# Patient Record
Sex: MALE | Race: WHITE | Employment: OTHER | URBAN - NONMETROPOLITAN AREA
[De-identification: names, ages, dates, MRNs, and addresses within clinical notes are randomized per-mention and may not be internally consistent; named-entity substitution may affect disease eponyms.]

---

## 2023-02-06 ENCOUNTER — APPOINTMENT (OUTPATIENT)
Dept: GENERAL RADIOLOGY | Age: 48
End: 2023-02-06
Payer: COMMERCIAL

## 2023-02-06 ENCOUNTER — HOSPITAL ENCOUNTER (EMERGENCY)
Age: 48
Discharge: HOME | End: 2023-02-06
Attending: EMERGENCY MEDICINE
Payer: COMMERCIAL

## 2023-02-06 VITALS
WEIGHT: 215 LBS | SYSTOLIC BLOOD PRESSURE: 138 MMHG | RESPIRATION RATE: 18 BRPM | HEIGHT: 71 IN | DIASTOLIC BLOOD PRESSURE: 107 MMHG | OXYGEN SATURATION: 95 % | TEMPERATURE: 97.2 F | HEART RATE: 93 BPM | BODY MASS INDEX: 30.1 KG/M2

## 2023-02-06 DIAGNOSIS — R07.89 ATYPICAL CHEST PAIN: Primary | ICD-10-CM

## 2023-02-06 LAB
ALBUMIN SERPL BCP-MCNC: 4.1 GM/DL (ref 3.4–5)
ALP SERPL-CCNC: 62 U/L (ref 46–116)
ALT SERPL W P-5'-P-CCNC: 62 U/L (ref 14–63)
ANION GAP SERPL CALC-SCNC: 6 MEQ/L (ref 8–16)
AST SERPL W P-5'-P-CCNC: 20 U/L (ref 15–37)
BASOPHILS # BLD: 0.5 % (ref 0–3)
BASOPHILS ABSOLUTE: 0 THOU/MM3 (ref 0–0.1)
BILIRUB SERPL-MCNC: 0.8 MG/DL (ref 0.2–1)
BUN SERPL-MCNC: 13 MG/DL (ref 7–18)
CALCIUM SERPL-MCNC: 8.9 MG/DL (ref 8.5–10.1)
CHLORIDE SERPL-SCNC: 101 MEQ/L (ref 98–107)
CO2 SERPL-SCNC: 30 MEQ/L (ref 21–32)
CREAT SERPL-MCNC: 0.9 MG/DL (ref 0.6–1.3)
EOSINOPHILS ABSOLUTE: 0.2 THOU/MM3 (ref 0–0.5)
EOSINOPHILS RELATIVE PERCENT: 3.2 % (ref 0–4)
GFR SERPL CREATININE-BSD FRML MDRD: > 60 ML/MIN/1.73M2
GLUCOSE SERPL-MCNC: 103 MG/DL (ref 74–106)
HCT VFR BLD CALC: 42.6 % (ref 42–52)
HEMOGLOBIN: 14.7 GM/DL (ref 14–18)
IMMATURE GRANS (ABS): 0 THOU/MM3 (ref 0–0.07)
IMMATURE GRANULOCYTES: 0 %
LYMPHOCYTES # BLD AUTO: 31 % (ref 15–47)
LYMPHOCYTES ABSOLUTE: 1.9 THOU/MM3 (ref 1–4.8)
MAGNESIUM SERPL-MCNC: 2.2 MG/DL (ref 1.8–2.4)
MCH RBC QN AUTO: 29.9 PG (ref 26–32)
MCHC RBC AUTO-ENTMCNC: 34.5 GM/DL (ref 31–35)
MCV RBC AUTO: 86.8 FL (ref 80–94)
MONOCYTES: 0.3 THOU/MM3 (ref 0.3–1.3)
MONOCYTES: 5.4 % (ref 0–12)
PDW BLD-RTO: 12.1 % (ref 11.5–14.9)
PLATELET # BLD AUTO: 294 THOU/MM3 (ref 130–400)
PMV BLD AUTO: 8.9 FL (ref 9.4–12.4)
POTASSIUM SERPL-SCNC: 4.2 MEQ/L (ref 3.5–5.1)
PROT SERPL-MCNC: 7.7 GM/DL (ref 6.4–8.2)
RBC # BLD: 4.91 MILL/MM3 (ref 4.5–6.1)
SEG NEUTROPHILS: 59.9 % (ref 43–75)
SEGMENTED NEUTROPHILS ABSOLUTE COUNT: 3.8 THOU/MM3 (ref 1.8–7.7)
SODIUM SERPL-SCNC: 137 MEQ/L (ref 136–145)
TROPONIN, HIGH SENSITIVITY: < 4 PG/ML (ref 0–76.1)
WBC # BLD: 6.3 THOU/MM3 (ref 4.8–10.8)

## 2023-02-06 PROCEDURE — 84484 ASSAY OF TROPONIN QUANT: CPT

## 2023-02-06 PROCEDURE — 93005 ELECTROCARDIOGRAM TRACING: CPT | Performed by: EMERGENCY MEDICINE

## 2023-02-06 PROCEDURE — 80053 COMPREHEN METABOLIC PANEL: CPT

## 2023-02-06 PROCEDURE — 85025 COMPLETE CBC W/AUTO DIFF WBC: CPT

## 2023-02-06 PROCEDURE — 71046 X-RAY EXAM CHEST 2 VIEWS: CPT

## 2023-02-06 PROCEDURE — 99285 EMERGENCY DEPT VISIT HI MDM: CPT

## 2023-02-06 PROCEDURE — 83735 ASSAY OF MAGNESIUM: CPT

## 2023-02-06 PROCEDURE — 6370000000 HC RX 637 (ALT 250 FOR IP): Performed by: EMERGENCY MEDICINE

## 2023-02-06 RX ORDER — LOSARTAN POTASSIUM 50 MG/1
50 TABLET ORAL DAILY
COMMUNITY
End: 2023-02-07 | Stop reason: SDUPTHER

## 2023-02-06 RX ORDER — ASPIRIN 81 MG/1
324 TABLET, CHEWABLE ORAL ONCE
Status: COMPLETED | OUTPATIENT
Start: 2023-02-06 | End: 2023-02-06

## 2023-02-06 RX ADMIN — ASPIRIN 324 MG: 81 TABLET, CHEWABLE ORAL at 08:25

## 2023-02-06 ASSESSMENT — HEART SCORE: ECG: 0

## 2023-02-06 ASSESSMENT — PAIN - FUNCTIONAL ASSESSMENT
PAIN_FUNCTIONAL_ASSESSMENT: NONE - DENIES PAIN
PAIN_FUNCTIONAL_ASSESSMENT: NONE - DENIES PAIN

## 2023-02-06 ASSESSMENT — ENCOUNTER SYMPTOMS
NAUSEA: 0
ABDOMINAL PAIN: 0
SHORTNESS OF BREATH: 0
BLURRED VISION: 0
COUGH: 0
WHEEZING: 0

## 2023-02-06 NOTE — ED PROVIDER NOTES
[facility]  eMERGENCY dEPARTMENT eNCOUnter             [address]    CHIEF COMPLAINT    My blood pressure has been high    Nurses Notes reviewed and I agree except as noted in the HPI. HPI    Laurence Woodruff is a 50 y.o. male who presents stating that over the weekend he was feeling some pressure in his head and behind his eyes with occasional pressure in his chest and his left shoulder. He started checking his blood pressure and it was elevated. At 4:00 this morning taking his losartan 50 mg early because he had a pressure sensation in his head and he could not sleep. He has been on medication for blood pressure for a while, and it has been doing well up until recently. He denies any history of heart disease. He denies any current chest pain. REVIEW OF SYSTEMS      Review of Systems   Constitutional:  Positive for malaise/fatigue. HENT:  Negative for congestion. Eyes:  Negative for blurred vision. Respiratory:  Negative for cough, shortness of breath and wheezing. Cardiovascular:  Negative for palpitations and leg swelling. Gastrointestinal:  Negative for abdominal pain and nausea. Musculoskeletal: Negative. Neurological:  Negative for dizziness and focal weakness. \"Pressure \"in head   All other systems reviewed and are negative. PAST MEDICAL HISTORY     has a past medical history of Hypertension. SURGICAL HISTORY     has a past surgical history that includes cyst removal (2004). CURRENT MEDICATIONS    Previous Medications    LOSARTAN (COZAAR) 50 MG TABLET    Take 50 mg by mouth daily       ALLERGIES    has No Known Allergies. FAMILY HISTORY    He indicated that the status of his father is unknown. He indicated that the status of his brother is unknown.  He indicated that the status of his paternal uncle is unknown.   family history includes Heart Disease in his paternal uncle; High Blood Pressure in his brother and father. SOCIAL HISTORY     reports that he has never smoked. He has never used smokeless tobacco. He reports that he does not drink alcohol and does not use drugs. PHYSICAL EXAM       INITIAL VITALS: BP (!) 138/107   Pulse 93   Temp 97.2 °F (36.2 °C) (Temporal)   Resp 18   Ht 5' 11\" (1.803 m)   Wt 215 lb (97.5 kg)   SpO2 95%   BMI 29.99 kg/m²        Physical Exam  Vitals and nursing note reviewed. Exam conducted with a chaperone present. Constitutional:       General: He is not in acute distress. Appearance: He is not ill-appearing. HENT:      Nose: Nose normal.      Mouth/Throat:      Mouth: Mucous membranes are moist.   Eyes:      Extraocular Movements: Extraocular movements intact. Pupils: Pupils are equal, round, and reactive to light. Cardiovascular:      Rate and Rhythm: Normal rate and regular rhythm. Heart sounds: No murmur heard. Pulmonary:      Effort: Pulmonary effort is normal. No respiratory distress. Breath sounds: Normal breath sounds. No wheezing. Abdominal:      General: Bowel sounds are normal.      Palpations: Abdomen is soft. There is no mass. Tenderness: There is no abdominal tenderness. Musculoskeletal:         General: No swelling or tenderness. Cervical back: Neck supple. No tenderness. Skin:     General: Skin is warm and dry. Findings: No rash. Neurological:      General: No focal deficit present. Mental Status: He is alert and oriented to person, place, and time. Psychiatric:         Behavior: Behavior normal.        DIFFERENTIAL DIAGNOSIS:    uncontrolled hypertension symptomatic, acute cardiac ischemia/infarct      DIAGNOSTIC RESULTS    EKG     Normal sinus rhythm, normal axes, normal intervals, normal EKG. RADIOLOGY:    XR CHEST (2 VW)   Final Result   No acute intrathoracic process. **This report has been created using voice recognition software.   It may contain minor errors which are inherent in voice recognition technology. **      Final report electronically signed by Dr Linsey Stevenson on 2/6/2023 9:01 AM          LABS:     Labs Reviewed   CBC WITH AUTO DIFFERENTIAL - Abnormal; Notable for the following components:       Result Value    MPV 8.9 (*)     All other components within normal limits   ANION GAP - Abnormal; Notable for the following components:    Anion Gap 6.0 (*)     All other components within normal limits   COMPREHENSIVE METABOLIC PANEL   TROPONIN   MAGNESIUM   GLOMERULAR FILTRATION RATE, ESTIMATED       Vitals:    Vitals:    02/06/23 0923 02/06/23 0938 02/06/23 0959 02/06/23 1008   BP: (!) 137/96 (!) 132/91 (!) 132/93 (!) 138/107   Pulse: 93 96 86 93   Resp: 15 20 19 18   Temp:       TempSrc:       SpO2: 96% 94% 96% 95%   Weight:       Height:           EMERGENCY DEPARTMENT COURSE:      I had him take his own losartan, additional 50 mg orally. Aspirin 324 mg given. He was monitored, with gradual improvement of his blood pressure. DIFFERENTIAL DIAGNOSIS: Uncontrolled hypertension, cardiac ischemia, anxiety    NUMBER OF PROBLEMS ADDRESSED: Pressure in head and chest, observed high blood pressure    COMPLEXITY/SEVERITY OF PROBLEMS ADDRESSED: Moderate  DATA INDEPENDENTLY REVIEWED: EKG, normal, no acute pattern of infarct or ischemia    RESULTS AND DISPOSITION DISCUSSED WITH: Patient, wife  SHARED DESCISION MAKING: With the patient  PRESCRIPTION DRUG MANAGEMENT:        GIVEN IN ED: Additional losartan 50 mg orally, patient took 50 mg at home this morning at 4:00, aspirin 324 mg given in the emergency department. PRESCRIBED FOR HOME USE:       DISCONTINUED HOME MEDICATION:       MODIFICATION OF DOSING: Increase losartan to 100 mg daily      Dr. Pretty Robbins dictating at 10:20 AM:  Patient was endorsed to me from Dr. Janette Mckee. I evaluated the patient at bedside at 9:15 AM, I obtained my own history and physical examination. I performed heart score.   Patient has low risk factors for CAD, his heart score was calculated by me as 1. His chest pain characteristics sounds atypical.  I reviewed his EKG, chest x-ray and laboratory studies , they were all interpreted by me as normal .patient is stable for discharge. I referred him to cardiology, he was placed on [facility] cardiology schedule for 9 AM tomorrow. Patient understood the importance of this follow-up and have an outpatient stress test.      FINAL IMPRESSION      1. Atypical chest pain        DISPOSITION/PLAN    DISPOSITION Decision To Discharge 02/06/2023 10:13:29 AM    Discharged home in good condition.     PATIENT REFERRED TO:  [facility]    DISCHARGE MEDICATIONS:    New Prescriptions    No medications on file          (Please note that portions of this note were completed with a voice recognition program.  Efforts were made to edit the dictations but occasionally words are mis-transcribed.)      Letty Cespedes MD  02/06/23 [id number]

## 2023-02-06 NOTE — ED NOTES
Pt presents w/ c/o HTN. States that yesterday he had some palpitations, pounding in his head, some achiness in his chest and in his left arm. Denies SOB, nausea or breaking out into a sweat. Respirations regular and easy. No distress noted.      Zoila Lott RN  02/06/23 [id number]

## 2023-02-06 NOTE — DISCHARGE INSTRUCTIONS
Please follow-up with [id number] cardiology at 9 AM tomorrow as discussed  No extraneous physical activity until you see cardiology  Please call 911 if you develop worsening chest pain or new symptoms

## 2023-02-07 ENCOUNTER — OFFICE VISIT (OUTPATIENT)
Dept: CARDIOLOGY CLINIC | Age: 48
End: 2023-02-07
Payer: COMMERCIAL

## 2023-02-07 VITALS
SYSTOLIC BLOOD PRESSURE: 149 MMHG | HEART RATE: 83 BPM | DIASTOLIC BLOOD PRESSURE: 95 MMHG | WEIGHT: 217.8 LBS | BODY MASS INDEX: 30.49 KG/M2 | HEIGHT: 71 IN

## 2023-02-07 DIAGNOSIS — R07.89 CHEST PAIN, ATYPICAL: Primary | ICD-10-CM

## 2023-02-07 DIAGNOSIS — I10 PRIMARY HYPERTENSION: ICD-10-CM

## 2023-02-07 LAB
EKG ATRIAL RATE: 90 BPM
EKG P AXIS: 17 DEGREES
EKG P-R INTERVAL: 186 MS
EKG Q-T INTERVAL: 372 MS
EKG QRS DURATION: 106 MS
EKG QTC CALCULATION (BAZETT): 455 MS
EKG R AXIS: -10 DEGREES
EKG T AXIS: 34 DEGREES
EKG VENTRICULAR RATE: 90 BPM

## 2023-02-07 PROCEDURE — 3080F DIAST BP >= 90 MM HG: CPT | Performed by: INTERNAL MEDICINE

## 2023-02-07 PROCEDURE — 99203 OFFICE O/P NEW LOW 30 MIN: CPT | Performed by: INTERNAL MEDICINE

## 2023-02-07 PROCEDURE — 3077F SYST BP >= 140 MM HG: CPT | Performed by: INTERNAL MEDICINE

## 2023-02-07 PROCEDURE — G8484 FLU IMMUNIZE NO ADMIN: HCPCS | Performed by: INTERNAL MEDICINE

## 2023-02-07 PROCEDURE — 93010 ELECTROCARDIOGRAM REPORT: CPT | Performed by: INTERNAL MEDICINE

## 2023-02-07 PROCEDURE — G8419 CALC BMI OUT NRM PARAM NOF/U: HCPCS | Performed by: INTERNAL MEDICINE

## 2023-02-07 PROCEDURE — G8427 DOCREV CUR MEDS BY ELIG CLIN: HCPCS | Performed by: INTERNAL MEDICINE

## 2023-02-07 PROCEDURE — 1036F TOBACCO NON-USER: CPT | Performed by: INTERNAL MEDICINE

## 2023-02-07 RX ORDER — ASPIRIN 81 MG/1
81 TABLET ORAL DAILY
Qty: 90 TABLET | Refills: 1 | COMMUNITY
Start: 2023-02-07

## 2023-02-07 RX ORDER — LOSARTAN POTASSIUM 50 MG/1
50 TABLET ORAL DAILY
Qty: 90 TABLET | Refills: 1 | Status: SHIPPED | OUTPATIENT
Start: 2023-02-07

## 2023-02-07 NOTE — PROGRESS NOTES
Chief Complaint   Patient presents with    New Patient    Follow-Up from Hospital    Chest Pain     New patient referral from ED due to CP and high BP. EKG done 2-6-2023. Chest pain pressure yesterday , seen in ER and sent home , last 30 sec, 5/10, radiate to left arm, no associated sob or sweating  Not exertion related     No cp after d/c    He was physical work  prior  but no cp with exertion    Denied sob, palpitation, dizziness or edema    Bp running high at home in the last few days     Patient states he took 100 mg of losartan today as per ED instructions. 2 weeks back had URTI    Nonsmoker    FHX  None for CAD  Ankle had CMP      Past Surgical History:   Procedure Laterality Date    CYST REMOVAL  2004    neck       No Known Allergies     Family History   Problem Relation Age of Onset    High Blood Pressure Father     High Blood Pressure Brother     Heart Disease Paternal Uncle         Social History     Socioeconomic History    Marital status:      Spouse name: Not on file    Number of children: Not on file    Years of education: Not on file    Highest education level: Not on file   Occupational History    Not on file   Tobacco Use    Smoking status: Never    Smokeless tobacco: Never   Substance and Sexual Activity    Alcohol use: Never    Drug use: Never    Sexual activity: Not on file   Other Topics Concern    Not on file   Social History Narrative    Not on file     Social Determinants of Health     Financial Resource Strain: Not on file   Food Insecurity: Not on file   Transportation Needs: Not on file   Physical Activity: Not on file   Stress: Not on file   Social Connections: Not on file   Intimate Partner Violence: Not on file   Housing Stability: Not on file       Current Outpatient Medications   Medication Sig Dispense Refill    losartan (COZAAR) 50 MG tablet Take 50 mg by mouth daily       No current facility-administered medications for this visit.        Review of Systems - General ROS: negative  Psychological ROS: negative  Hematological and Lymphatic ROS: No history of blood clots or bleeding disorder. Respiratory ROS: no cough,  or wheezing, the rest see HPI  Cardiovascular ROS: See HPI  Gastrointestinal ROS: negative  Genito-Urinary ROS: no dysuria, trouble voiding, or hematuria  Musculoskeletal ROS: negative  Neurological ROS: no TIA or stroke symptoms  Dermatological ROS: negative      Blood pressure (!) 147/97, pulse 93, height 5' 11\" (1.803 m), weight 217 lb 12.8 oz (98.8 kg). Physical Examination:    General appearance - alert, well appearing, and in no distress  HEENT- Pink conjunctiva  , Non-icteri sclera,PERRLA  Mental status - alert, oriented to person, place, and time  Neck - supple, no significant adenopathy, no JVD, or carotid bruits  Chest - clear to auscultation, no wheezes, rales or rhonchi, symmetric air entry  Heart - normal rate, regular rhythm, normal S1, S2, no murmurs, rubs, clicks or gallops  Abdomen - soft, nontender, nondistended, no masses or organomegaly  OSMAN- no CVA or flank tenderness, no suprapubic tenderness  Neurological - alert, oriented, normal speech, no focal findings or movement disorder noted  Musculoskeletal/limbs - no joint tenderness, deformity or swelling   - peripheral pulses normal, no pedal edema, no clubbing or cyanosis  Skin - normal coloration and turgor, no rashes, no suspicious skin lesions noted  Psych- appropriate mood and affect    Lab  No results for input(s): CKTOTAL, CKMB, CKMBINDEX, TROPONINI in the last 72 hours.   CBC:   Lab Results   Component Value Date/Time    WBC 6.3 02/06/2023 08:53 AM    RBC 4.91 02/06/2023 08:53 AM    HGB 14.7 02/06/2023 08:53 AM    HCT 42.6 02/06/2023 08:53 AM    MCV 86.8 02/06/2023 08:53 AM    MCH 29.9 02/06/2023 08:53 AM    MCHC 34.5 02/06/2023 08:53 AM    RDW 12.1 02/06/2023 08:53 AM     02/06/2023 08:53 AM    MPV 8.9 02/06/2023 08:53 AM     BMP:    Lab Results   Component Value Date/Time     02/06/2023 08:54 AM    K 4.2 02/06/2023 08:54 AM     02/06/2023 08:54 AM    CO2 30 02/06/2023 08:54 AM    BUN 13 02/06/2023 08:54 AM    LABALBU 4.1 02/06/2023 08:54 AM    CREATININE 0.9 02/06/2023 08:54 AM    GLUCOSE 103 02/06/2023 08:54 AM     Hepatic Function Panel:    Lab Results   Component Value Date/Time    ALKPHOS 62 02/06/2023 08:54 AM    ALT 62 02/06/2023 08:54 AM    AST 20 02/06/2023 08:54 AM    PROT 7.7 02/06/2023 08:54 AM    BILITOT 0.8 02/06/2023 08:54 AM    LABALBU 4.1 02/06/2023 08:54 AM     Magnesium:    Lab Results   Component Value Date/Time    MG 2.2 02/06/2023 08:54 AM     Warfarin PT/INR:  No components found for: PTPATWAR, PTINRWAR  HgBA1c:  No results found for: LABA1C  FLP:  No results found for: TRIG, HDL, LDLCALC, LDLDIRECT, LABVLDL  TSH:  No results found for: TSH    Ekg 2/6/23  Normal sinus rhythm Normal ECG No previous ECGs available Confirmed by Blas Doan MD, [facility]   Trop neg  Assessment  Veras      Diagnosis Orders   1. Chest pain, atypical        2. Primary hypertension            Plan     Continue the current treatment and with constant vigilance to changes in symptoms and also any potential side effects. Return for care or seek medical attention immediately if symptoms got worse and/or develop new symptoms. Chest pain atypical  Echo  Ex nuc   Cont asa 81    Hypertension, on medical treatment. Seems to be under good control. Patient is compliant with medical treatment.    Cont losartan 100 mg po qd  Home Bp check advised     D/w the pat the plan of care     Rtc in 3 weeks        Anay Alvarez, [facility]

## 2023-02-13 ENCOUNTER — HOSPITAL ENCOUNTER (OUTPATIENT)
Dept: NON INVASIVE DIAGNOSTICS | Age: 48
Discharge: HOME | End: 2023-02-13
Payer: COMMERCIAL

## 2023-02-13 DIAGNOSIS — I10 PRIMARY HYPERTENSION: ICD-10-CM

## 2023-02-13 DIAGNOSIS — R07.89 CHEST PAIN, ATYPICAL: ICD-10-CM

## 2023-02-13 LAB
LV EF: 60 %
LV EF: 62 %
LVEF MODALITY: NORMAL
LVEF MODALITY: NORMAL

## 2023-02-13 PROCEDURE — 93306 TTE W/DOPPLER COMPLETE: CPT

## 2023-02-13 PROCEDURE — 93017 CV STRESS TEST TRACING ONLY: CPT | Performed by: INTERNAL MEDICINE

## 2023-02-13 PROCEDURE — 78452 HT MUSCLE IMAGE SPECT MULT: CPT | Performed by: INTERNAL MEDICINE

## 2023-02-13 PROCEDURE — 3430000000 HC RX DIAGNOSTIC RADIOPHARMACEUTICAL: Performed by: INTERNAL MEDICINE

## 2023-02-13 PROCEDURE — A9500 TC99M SESTAMIBI: HCPCS | Performed by: INTERNAL MEDICINE

## 2023-02-13 RX ORDER — TECHNETIUM TC-99M SESTAMIBI 1 MG/10ML
8.7 INJECTION INTRAVENOUS
Status: COMPLETED | OUTPATIENT
Start: 2023-02-13 | End: 2023-02-13

## 2023-02-13 RX ORDER — TECHNETIUM TC-99M SESTAMIBI 1 MG/10ML
30.7 INJECTION INTRAVENOUS
Status: COMPLETED | OUTPATIENT
Start: 2023-02-13 | End: 2023-02-13

## 2023-02-13 RX ADMIN — Medication 30.7 MILLICURIE: at 15:09

## 2023-02-13 RX ADMIN — Medication 8.7 MILLICURIE: at 14:00

## 2023-02-27 ENCOUNTER — OFFICE VISIT (OUTPATIENT)
Dept: CARDIOLOGY CLINIC | Age: 48
End: 2023-02-27
Payer: COMMERCIAL

## 2023-02-27 VITALS
HEIGHT: 71 IN | DIASTOLIC BLOOD PRESSURE: 86 MMHG | BODY MASS INDEX: 31.4 KG/M2 | WEIGHT: 224.3 LBS | HEART RATE: 88 BPM | SYSTOLIC BLOOD PRESSURE: 141 MMHG

## 2023-02-27 DIAGNOSIS — R07.89 CHEST PRESSURE: Primary | ICD-10-CM

## 2023-02-27 DIAGNOSIS — I10 PRIMARY HYPERTENSION: ICD-10-CM

## 2023-02-27 PROCEDURE — G8484 FLU IMMUNIZE NO ADMIN: HCPCS | Performed by: INTERNAL MEDICINE

## 2023-02-27 PROCEDURE — 1036F TOBACCO NON-USER: CPT | Performed by: INTERNAL MEDICINE

## 2023-02-27 PROCEDURE — 99214 OFFICE O/P EST MOD 30 MIN: CPT | Performed by: INTERNAL MEDICINE

## 2023-02-27 PROCEDURE — G8417 CALC BMI ABV UP PARAM F/U: HCPCS | Performed by: INTERNAL MEDICINE

## 2023-02-27 PROCEDURE — 3077F SYST BP >= 140 MM HG: CPT | Performed by: INTERNAL MEDICINE

## 2023-02-27 PROCEDURE — 3079F DIAST BP 80-89 MM HG: CPT | Performed by: INTERNAL MEDICINE

## 2023-02-27 PROCEDURE — G8427 DOCREV CUR MEDS BY ELIG CLIN: HCPCS | Performed by: INTERNAL MEDICINE

## 2023-02-27 PROCEDURE — 93000 ELECTROCARDIOGRAM COMPLETE: CPT | Performed by: INTERNAL MEDICINE

## 2023-02-27 NOTE — PROGRESS NOTES
Chief Complaint   Patient presents with    Follow-up     Test results     Originally the patient referral from ED due to CP and high BP. Pt here for 3 week follow up after stress and echo on 2/13/23    Last EKG 2/6/23    Chest pain pressure yesterday , seen in ER and sent home , last 10 min, 5/10, radiate to left arm, no associated sob or sweating  Not exertion related   Associated palpitation but no sweat    BP tend to run higher    Getting more freq  and waking him up in the last 1 week    He was physical work  prior  but no cp with exertion    Denied sob,  dizziness or edema    Bp running high at home in the last few days     Patient states he took 100 mg of losartan today as per ED instructions.     2 weeks back had URTI    Nonsmoker    FHX  None for CAD  Ankle had CMP      Past Surgical History:   Procedure Laterality Date    CYST REMOVAL  2004    neck       No Known Allergies     Family History   Problem Relation Age of Onset    High Blood Pressure Father     High Blood Pressure Brother     Heart Disease Paternal Uncle         Social History     Socioeconomic History    Marital status:      Spouse name: Not on file    Number of children: Not on file    Years of education: Not on file    Highest education level: Not on file   Occupational History    Not on file   Tobacco Use    Smoking status: Never    Smokeless tobacco: Never   Substance and Sexual Activity    Alcohol use: Never    Drug use: Never    Sexual activity: Not on file   Other Topics Concern    Not on file   Social History Narrative    Not on file     Social Determinants of Health     Financial Resource Strain: Not on file   Food Insecurity: Not on file   Transportation Needs: Not on file   Physical Activity: Not on file   Stress: Not on file   Social Connections: Not on file   Intimate Partner Violence: Not on file   Housing Stability: Not on file       Current Outpatient Medications   Medication Sig Dispense Refill    metoprolol tartrate (LOPRESSOR) 25 MG tablet Take 1 tablet by mouth 2 times daily 60 tablet 1    aspirin EC 81 MG EC tablet Take 1 tablet by mouth daily 90 tablet 1    losartan (COZAAR) 50 MG tablet Take 1 tablet by mouth daily (Patient taking differently: Take 100 mg by mouth daily) 90 tablet 1     No current facility-administered medications for this visit. Review of Systems -     General ROS: negative  Psychological ROS: negative  Hematological and Lymphatic ROS: No history of blood clots or bleeding disorder. Respiratory ROS: no cough,  or wheezing, the rest see HPI  Cardiovascular ROS: See HPI  Gastrointestinal ROS: negative  Genito-Urinary ROS: no dysuria, trouble voiding, or hematuria  Musculoskeletal ROS: negative  Neurological ROS: no TIA or stroke symptoms  Dermatological ROS: negative      Blood pressure (!) 141/86, pulse 88, height 5' 11\" (1.803 m), weight 224 lb 4.8 oz (101.7 kg). Physical Examination:    General appearance - alert, well appearing, and in no distress  HEENT- Pink conjunctiva  , Non-icteri sclera,PERRLA  Mental status - alert, oriented to person, place, and time  Neck - supple, no significant adenopathy, no JVD, or carotid bruits  Chest - clear to auscultation, no wheezes, rales or rhonchi, symmetric air entry  Heart - normal rate, regular rhythm, normal S1, S2, no murmurs, rubs, clicks or gallops  Abdomen - soft, nontender, nondistended, no masses or organomegaly  OSMAN- no CVA or flank tenderness, no suprapubic tenderness  Neurological - alert, oriented, normal speech, no focal findings or movement disorder noted  Musculoskeletal/limbs - no joint tenderness, deformity or swelling   - peripheral pulses normal, no pedal edema, no clubbing or cyanosis  Skin - normal coloration and turgor, no rashes, no suspicious skin lesions noted  Psych- appropriate mood and affect    Lab  No results for input(s): CKTOTAL, CKMB, CKMBINDEX, TROPONINI in the last 72 hours.   CBC:   Lab Results   Component Value Date/Time    WBC 6.3 02/06/2023 08:53 AM    RBC 4.91 02/06/2023 08:53 AM    HGB 14.7 02/06/2023 08:53 AM    HCT 42.6 02/06/2023 08:53 AM    MCV 86.8 02/06/2023 08:53 AM    MCH 29.9 02/06/2023 08:53 AM    MCHC 34.5 02/06/2023 08:53 AM    RDW 12.1 02/06/2023 08:53 AM     02/06/2023 08:53 AM    MPV 8.9 02/06/2023 08:53 AM     BMP:    Lab Results   Component Value Date/Time     02/06/2023 08:54 AM    K 4.2 02/06/2023 08:54 AM     02/06/2023 08:54 AM    CO2 30 02/06/2023 08:54 AM    BUN 13 02/06/2023 08:54 AM    LABALBU 4.1 02/06/2023 08:54 AM    CREATININE 0.9 02/06/2023 08:54 AM    GLUCOSE 103 02/06/2023 08:54 AM     Hepatic Function Panel:    Lab Results   Component Value Date/Time    ALKPHOS 62 02/06/2023 08:54 AM    ALT 62 02/06/2023 08:54 AM    AST 20 02/06/2023 08:54 AM    PROT 7.7 02/06/2023 08:54 AM    BILITOT 0.8 02/06/2023 08:54 AM    LABALBU 4.1 02/06/2023 08:54 AM     Magnesium:    Lab Results   Component Value Date/Time    MG 2.2 02/06/2023 08:54 AM     Warfarin PT/INR:  No components found for: PTPATWAR, PTINRWAR  HgBA1c:  No results found for: LABA1C  FLP:  No results found for: TRIG, HDL, LDLCALC, LDLDIRECT, LABVLDL  TSH:  No results found for: TSH          Peak HR:150 bpm                      HR response: Appropriate    Peak BP:200/100 mmHg                 BP response: Normal Resting BP with    Predicted HR: 171 bpm                appropriate response to Stress    % of predicted HR: 88                HR/BP product:[id number]    Test duration:8.5 min                Max exercise: 10.1 METS    Reason for termination:Physician    request                              Exercise effort:Good       Conclusions        Summary    Exercise EKG stress test is not suggestive for ischemia. The nuclear images is not suggestive for myocardial ischemia.         Signatures        ----------------------------------------------------------------    Electronically signed by Karyle Deforest MD (Interpreting Cardiologist) on 02/13/2023 at 19:34    ----------------------------------------------------------------           Conclusions      Summary   Normal left ventricle size and systolic function. Ejection fraction was   estimated at 60 %. There were no regional left ventricular wall motion   abnormalities and wall thickness was within normal limits. Signature      ----------------------------------------------------------------   Electronically signed by Shankar Norris MD (Interpreting   physician) on 02/13/2023 at 08:31 PM    Ekg 2/6/23  Normal sinus rhythm Normal ECG No previous ECGs available Confirmed by Rob Cowan MD, [facility]   Trop neg    Ekg   2/27/23    Assessment  Veras      Diagnosis Orders   1. Chest pressure  EKG 12 lead    XR Cardiac Cath Procedure      2. Primary hypertension  EKG 12 lead    XR Cardiac Cath Procedure            Plan     Meds and lab reviewed    Continue the current treatment and with constant vigilance to changes in symptoms and also any potential side effects. Return for care or seek medical attention immediately if symptoms got worse and/or develop new symptoms. Chest pain - more freq and worse  Getting worse and getting cp everyday  Going on in the last 3 week  Waking him up in the night  Echo and Ex nuc - WNL  Cont asa 81  Start lopressor 25 bid      Need cardiac cath due to more freq cp- recurrent    The risk and benefit of left heart cath has been explain in detail including but not limited to  Bleeding including retroperitoneal bleed 1%, infection, MI, CVA, PUSHPA, Limb loss, dissection, allergic reaction,death  Each of them 1 in 2000 range. The alternative managment has been explained. Patient expressed understanding of the risk and benefit and of the alternative managment well. Patient wanted and agreed to proceed with left heart cath. Hence we will schedule him for [facility]         Hypertension, on medical treatment. Seems to be under good control.  Patient is compliant with medical treatment.    Cont losartan 100 mg po qd  Home Bp check advised     D/w the pat the plan of care    Advised to get to ER if the chest pain get worse     Rtc in 3 weeks        Denise Pantoja, [facility]

## 2023-03-02 ENCOUNTER — PREP FOR PROCEDURE (OUTPATIENT)
Dept: CARDIOLOGY | Age: 48
End: 2023-03-02

## 2023-03-02 RX ORDER — SODIUM CHLORIDE 9 MG/ML
INJECTION, SOLUTION INTRAVENOUS PRN
Status: CANCELLED | OUTPATIENT
Start: 2023-03-02

## 2023-03-02 RX ORDER — SODIUM CHLORIDE 0.9 % (FLUSH) 0.9 %
5-40 SYRINGE (ML) INJECTION PRN
Status: CANCELLED | OUTPATIENT
Start: 2023-03-02

## 2023-03-02 RX ORDER — SODIUM CHLORIDE 0.9 % (FLUSH) 0.9 %
5-40 SYRINGE (ML) INJECTION EVERY 12 HOURS SCHEDULED
Status: CANCELLED | OUTPATIENT
Start: 2023-03-02

## 2023-03-02 RX ORDER — ASPIRIN 325 MG
325 TABLET ORAL ONCE
Status: CANCELLED | OUTPATIENT
Start: 2023-03-02 | End: 2023-03-02

## 2023-03-02 RX ORDER — NITROGLYCERIN 0.4 MG/1
0.4 TABLET SUBLINGUAL EVERY 5 MIN PRN
Status: CANCELLED | OUTPATIENT
Start: 2023-03-02

## 2023-03-03 ENCOUNTER — HOSPITAL ENCOUNTER (OUTPATIENT)
Dept: INPATIENT UNIT | Age: 48
Discharge: HOME | End: 2023-03-03
Attending: INTERNAL MEDICINE | Admitting: INTERNAL MEDICINE
Payer: COMMERCIAL

## 2023-03-03 VITALS
HEIGHT: 71 IN | SYSTOLIC BLOOD PRESSURE: 133 MMHG | WEIGHT: 220 LBS | OXYGEN SATURATION: 94 % | RESPIRATION RATE: 21 BRPM | DIASTOLIC BLOOD PRESSURE: 79 MMHG | BODY MASS INDEX: 30.8 KG/M2 | HEART RATE: 89 BPM

## 2023-03-03 PROBLEM — Z98.890 S/P CARDIAC CATH: Status: ACTIVE | Noted: 2023-03-03

## 2023-03-03 LAB
ABO: NORMAL
ANION GAP SERPL CALC-SCNC: 12 MEQ/L (ref 8–16)
ANTIBODY SCREEN: NORMAL
BUN SERPL-MCNC: 13 MG/DL (ref 7–22)
CALCIUM SERPL-MCNC: 9.9 MG/DL (ref 8.5–10.5)
CHLORIDE SERPL-SCNC: 102 MEQ/L (ref 98–111)
CHOLEST SERPL-MCNC: 250 MG/DL (ref 100–199)
CO2 SERPL-SCNC: 26 MEQ/L (ref 23–33)
CREAT SERPL-MCNC: 0.9 MG/DL (ref 0.4–1.2)
DEPRECATED MEAN GLUCOSE BLD GHB EST-ACNC: 93 MG/DL (ref 70–126)
DEPRECATED RDW RBC AUTO: 41.1 FL (ref 35–45)
ERYTHROCYTE [DISTWIDTH] IN BLOOD BY AUTOMATED COUNT: 12.5 % (ref 11.5–14.5)
GFR SERPL CREATININE-BSD FRML MDRD: > 60 ML/MIN/1.73M2
GLUCOSE SERPL-MCNC: 95 MG/DL (ref 70–108)
HBA1C MFR BLD HPLC: 5.1 % (ref 4.4–6.4)
HCT VFR BLD AUTO: 43.4 % (ref 42–52)
HDLC SERPL-MCNC: 35 MG/DL
HGB BLD-MCNC: 14.8 GM/DL (ref 14–18)
INR PPP: 1 (ref 0.85–1.13)
LDLC SERPL CALC-MCNC: 162 MG/DL
MCH RBC QN AUTO: 30.8 PG (ref 26–33)
MCHC RBC AUTO-ENTMCNC: 34.1 GM/DL (ref 32.2–35.5)
MCV RBC AUTO: 90.4 FL (ref 80–94)
PLATELET # BLD AUTO: 256 THOU/MM3 (ref 130–400)
PMV BLD AUTO: 9.3 FL (ref 9.4–12.4)
POTASSIUM SERPL-SCNC: 4.1 MEQ/L (ref 3.5–5.2)
RBC # BLD AUTO: 4.8 MILL/MM3 (ref 4.7–6.1)
RH FACTOR: NORMAL
SODIUM SERPL-SCNC: 140 MEQ/L (ref 135–145)
TRIGL SERPL-MCNC: 267 MG/DL (ref 0–199)
TSH SERPL DL<=0.005 MIU/L-ACNC: 1.67 UIU/ML (ref 0.4–4.2)
WBC # BLD AUTO: 6.7 THOU/MM3 (ref 4.8–10.8)

## 2023-03-03 PROCEDURE — 2500000003 HC RX 250 WO HCPCS

## 2023-03-03 PROCEDURE — C1769 GUIDE WIRE: HCPCS

## 2023-03-03 PROCEDURE — 85610 PROTHROMBIN TIME: CPT

## 2023-03-03 PROCEDURE — 93458 L HRT ARTERY/VENTRICLE ANGIO: CPT

## 2023-03-03 PROCEDURE — 93005 ELECTROCARDIOGRAM TRACING: CPT | Performed by: NURSE PRACTITIONER

## 2023-03-03 PROCEDURE — 2580000003 HC RX 258: Performed by: INTERNAL MEDICINE

## 2023-03-03 PROCEDURE — 80048 BASIC METABOLIC PNL TOTAL CA: CPT

## 2023-03-03 PROCEDURE — 83036 HEMOGLOBIN GLYCOSYLATED A1C: CPT

## 2023-03-03 PROCEDURE — 84443 ASSAY THYROID STIM HORMONE: CPT

## 2023-03-03 PROCEDURE — 86900 BLOOD TYPING SEROLOGIC ABO: CPT

## 2023-03-03 PROCEDURE — 36415 COLL VENOUS BLD VENIPUNCTURE: CPT

## 2023-03-03 PROCEDURE — 93458 L HRT ARTERY/VENTRICLE ANGIO: CPT | Performed by: INTERNAL MEDICINE

## 2023-03-03 PROCEDURE — C1894 INTRO/SHEATH, NON-LASER: HCPCS

## 2023-03-03 PROCEDURE — 86901 BLOOD TYPING SEROLOGIC RH(D): CPT

## 2023-03-03 PROCEDURE — 6360000002 HC RX W HCPCS

## 2023-03-03 PROCEDURE — 6360000004 HC RX CONTRAST MEDICATION: Performed by: INTERNAL MEDICINE

## 2023-03-03 PROCEDURE — 93005 ELECTROCARDIOGRAM TRACING: CPT | Performed by: INTERNAL MEDICINE

## 2023-03-03 PROCEDURE — 80061 LIPID PANEL: CPT

## 2023-03-03 PROCEDURE — 86850 RBC ANTIBODY SCREEN: CPT

## 2023-03-03 PROCEDURE — 85027 COMPLETE CBC AUTOMATED: CPT

## 2023-03-03 RX ORDER — NITROGLYCERIN 0.4 MG/1
0.4 TABLET SUBLINGUAL EVERY 5 MIN PRN
Status: DISCONTINUED | OUTPATIENT
Start: 2023-03-03 | End: 2023-03-03 | Stop reason: HOSPADM

## 2023-03-03 RX ORDER — SODIUM CHLORIDE 9 MG/ML
INJECTION, SOLUTION INTRAVENOUS PRN
Status: DISCONTINUED | OUTPATIENT
Start: 2023-03-03 | End: 2023-03-03 | Stop reason: HOSPADM

## 2023-03-03 RX ORDER — PANTOPRAZOLE SODIUM 40 MG/1
40 TABLET, DELAYED RELEASE ORAL DAILY
Qty: 30 TABLET | Refills: 1 | Status: SHIPPED | OUTPATIENT
Start: 2023-03-03

## 2023-03-03 RX ORDER — ASPIRIN 325 MG
325 TABLET ORAL ONCE
Status: DISCONTINUED | OUTPATIENT
Start: 2023-03-03 | End: 2023-03-03 | Stop reason: HOSPADM

## 2023-03-03 RX ORDER — ONDANSETRON 2 MG/ML
4 INJECTION INTRAMUSCULAR; INTRAVENOUS EVERY 6 HOURS PRN
Status: DISCONTINUED | OUTPATIENT
Start: 2023-03-03 | End: 2023-03-03 | Stop reason: HOSPADM

## 2023-03-03 RX ORDER — SODIUM CHLORIDE 0.9 % (FLUSH) 0.9 %
5-40 SYRINGE (ML) INJECTION EVERY 12 HOURS SCHEDULED
Status: DISCONTINUED | OUTPATIENT
Start: 2023-03-03 | End: 2023-03-03 | Stop reason: HOSPADM

## 2023-03-03 RX ORDER — SODIUM CHLORIDE 0.9 % (FLUSH) 0.9 %
5-40 SYRINGE (ML) INJECTION PRN
Status: DISCONTINUED | OUTPATIENT
Start: 2023-03-03 | End: 2023-03-03 | Stop reason: HOSPADM

## 2023-03-03 RX ORDER — LOSARTAN POTASSIUM 50 MG/1
100 TABLET ORAL DAILY
COMMUNITY

## 2023-03-03 RX ORDER — ACETAMINOPHEN 325 MG/1
650 TABLET ORAL EVERY 4 HOURS PRN
Status: DISCONTINUED | OUTPATIENT
Start: 2023-03-03 | End: 2023-03-03 | Stop reason: HOSPADM

## 2023-03-03 RX ORDER — SODIUM CHLORIDE 9 MG/ML
INJECTION, SOLUTION INTRAVENOUS CONTINUOUS
Status: DISCONTINUED | OUTPATIENT
Start: 2023-03-03 | End: 2023-03-03 | Stop reason: HOSPADM

## 2023-03-03 RX ADMIN — SODIUM CHLORIDE: 9 INJECTION, SOLUTION INTRAVENOUS at 13:43

## 2023-03-03 RX ADMIN — IOPAMIDOL 70 ML: 755 INJECTION, SOLUTION INTRAVENOUS at 15:50

## 2023-03-03 NOTE — H&P
[facility]  Sedation/Analgesia History & Physical    Pt Name: Denae Zacarias  MRN: [id number]  YOB: 1975  Provider Performing Procedure: Celina Griffin MD  Primary Care Physician: JEAN PIERRE Ceja CNP    PRE-PROCEDURE   DNR-CCA/DNR-CC []Yes [x]No  Brief History/Pre-Procedure Diagnosis:   Chest pain on exertion CCS class III recurreenbt going on the last 1 month  Despite meds   Need cardiacv cath    The risk and benefit of left heart cath has been explain in detail including but not limited to  Bleeding including retroperitoneal bleed 1%, infection, MI, CVA, PUSHPA, Limb loss, dissection, allergic reaction,death  Each of them 1 in 2000 range. The alternative managment has been explained. Patient expressed understanding of the risk and benefit and of the alternative managment well. Patient wanted and agreed to proceed with left heart cath. Hence we will schedule him for [facility]               MEDICAL HISTORY  []CAD/Valve  []Liver Disease  []Lung Disease []Diabetes  []Hypertension []Renal Disease  []Additional information:       has a past medical history of Hypertension. SURGICAL HISTORY   has a past surgical history that includes cyst removal (2004) and [place] tooth extraction (Bilateral).   Additional information:       ALLERGIES   Allergies as of 03/03/2023    (No Known Allergies)     Additional information:       MEDICATIONS   Coumadin Use Last 5 Days [x]No []Yes  Antiplatelet drug therapy use last 5 days  []No [x]Yes  Other anticoagulant use last 5 days  [x]No []Yes    Current Facility-Administered Medications:     sodium chloride flush 0.9 % injection 5-40 mL, 5-40 mL, IntraVENous, 2 times per day, JEAN PIERRE Hassan CNP    sodium chloride flush 0.9 % injection 5-40 mL, 5-40 mL, IntraVENous, PRN, JEAN PIERRE Hassan CNP    aspirin tablet 325 mg, 325 mg, Oral, Once, JEAN PIERRE Hassan CNP    nitroGLYCERIN (NITROSTAT) SL tablet 0.4 mg, 0.4 mg, SubLINGual, Q5 Min ALEXAN, JEAN PIERRE Domingo - CNP    0.9 % sodium chloride infusion, , IntraVENous, Continuous, Jagdish Graves MD, Last Rate: 75 mL/hr at 03/03/23 1343, New Bag at 03/03/23 1343  Prior to Admission medications    Medication Sig Start Date End Date Taking? Authorizing Provider   losartan (COZAAR) 50 MG tablet Take 100 mg by mouth daily   Yes Historical Provider, MD   metoprolol tartrate (LOPRESSOR) 25 MG tablet Take 1 tablet by mouth 2 times daily 2/27/23   Jagdish Graves MD   aspirin EC 81 MG EC tablet Take 1 tablet by mouth daily 2/7/23   Jagdish Graves MD     Additional information:       VITAL SIGNS   There were no vitals filed for this visit. PHYSICAL:   Heart:  [x]Regular rate and rhythm  []Other:    Lungs:  [x]Clear    []Other:    Abdomen: [x]Soft    []Other:    Mental Status: [x]Alert & Oriented  []Other:      PLANNED PROCEDURE   [x]Cath  []PCI                []Pacemaker/AICD  []KAYLAN             []Cardioversion []Peripheral angiography/PTA  []Other:      SEDATION  Planned agent:[x]Midazolam []Meperidine [x]Sublimaze []Morphine  []Diazepam  []Other:     ASA Classification:  []1 []2 [x]3 []4 []5  Class 1: A normal healthy patient  Class 2: Pt with mild to moderate systemic disease  Class 3: Severe systemic disease or disturbance  Class 4: Severe systemic disorders that are already life threatening. Class 5: Moribund pt with little chances of survival, for more than 24 hours. Mallampati I Airway Classification:   []1 [x]2 []3 []4    [x]Pre-procedure diagnostic studies complete and results available. Comment:    [x]Previous sedation/anesthesia experiences assessed. Comment:    [x]The patient is an appropriate candidate to undergo the planned procedure sedation and anesthesia. (Refer to nursing sedation/analgesia documentation record)  [x]Formulation and discussion of sedation/procedure plan, risks, and expectations with patient and/or responsible adult completed.   [x]Patient examined immediately prior to the procedure. (Refer to nursing sedation/analgesia documentation record)    Vera Acuña MD  Electronically signed 3/3/2023 at 3:01 PM

## 2023-03-03 NOTE — FLOWSHEET NOTE
1300 Patient admitted to 2E16  Ambulatory for left heart cath  Patient NPO. Patient accompanied by spouse  Vital signs obtained. Assessment and data collection intiated. Oriented to room. Policies and procedures for 2E explained. All questions answered with no further questions at this time. Fall precautions reviewed with patient. [address] 134 reviewed with patient and spouse. Patient and spouse verbalize understanding of the plan of care and contribute to goal setting. 1500 to cath lab per bed, stable condition. 1600 care taken over from cath lab, site with dressing to right wrist, vasc band dry and intact. 1800 up in room, tolerated activity well. 1845 Discharge instructions given. Patient goals/plan/treatment preferences discussed by this RN. Discharge planning provided by this RN. Patient goals/plan/treatment preferences reviewed with patient/family. Patient/family verbalize understanding of discharge plan and are in agreement with goal/plan/treatment preferences. Understanding was demonstrated using the teach back method. AVS provided by this RN at time of discharge, which includes all necessary medical information pertaining to the patients current course of illness, treatment, post-discharge goals of care, and treatment preferences. 1855 Discharged per wheelchair, stable condition.

## 2023-03-03 NOTE — DISCHARGE INSTRUCTIONS
Instructions    For Radial approach:  Cover site with dressing or bandaid for 5 days and change daily. Dressing from hospital  should be left intact until next morning. No lotions, creams or powder to site. Minimize movement of wrist for 24 hours, may use armboard. No lifting over 5 pounds with involved extremity for 5 days. May shower in 24 hours. Do not immerse arm in water for 5 days, example bathtub,dish washing,  hot tub, swimming pool. Call physician for any signs bleeding, swelling, pain, redness, coolness of extremity. If  bleeding or swelling occurs apply firm direct pressure to site for 15 minutes and call 911/ physician. Take it easy for 5 days. No driving for 5 days. Always wash hands before touching incision area.

## 2023-03-04 LAB
EKG ATRIAL RATE: 74 BPM
EKG ATRIAL RATE: 87 BPM
EKG P AXIS: 23 DEGREES
EKG P AXIS: 9 DEGREES
EKG P-R INTERVAL: 200 MS
EKG P-R INTERVAL: 216 MS
EKG Q-T INTERVAL: 376 MS
EKG Q-T INTERVAL: 396 MS
EKG QRS DURATION: 106 MS
EKG QRS DURATION: 110 MS
EKG QTC CALCULATION (BAZETT): 439 MS
EKG QTC CALCULATION (BAZETT): 452 MS
EKG R AXIS: -9 DEGREES
EKG R AXIS: 72 DEGREES
EKG T AXIS: 22 DEGREES
EKG T AXIS: 37 DEGREES
EKG VENTRICULAR RATE: 74 BPM
EKG VENTRICULAR RATE: 87 BPM

## 2023-03-04 PROCEDURE — 93010 ELECTROCARDIOGRAM REPORT: CPT | Performed by: INTERNAL MEDICINE

## 2023-03-16 ENCOUNTER — OFFICE VISIT (OUTPATIENT)
Dept: CARDIOLOGY CLINIC | Age: 48
End: 2023-03-16
Payer: COMMERCIAL

## 2023-03-16 VITALS
BODY MASS INDEX: 31.16 KG/M2 | SYSTOLIC BLOOD PRESSURE: 139 MMHG | HEIGHT: 71 IN | WEIGHT: 222.6 LBS | DIASTOLIC BLOOD PRESSURE: 89 MMHG | HEART RATE: 73 BPM

## 2023-03-16 DIAGNOSIS — I10 PRIMARY HYPERTENSION: ICD-10-CM

## 2023-03-16 DIAGNOSIS — R00.0 SINUS TACHYCARDIA: ICD-10-CM

## 2023-03-16 DIAGNOSIS — R07.89 CHEST PAIN, ATYPICAL: ICD-10-CM

## 2023-03-16 DIAGNOSIS — Z98.890 S/P CARDIAC CATH: Primary | ICD-10-CM

## 2023-03-16 PROCEDURE — 93000 ELECTROCARDIOGRAM COMPLETE: CPT | Performed by: INTERNAL MEDICINE

## 2023-03-16 PROCEDURE — 1036F TOBACCO NON-USER: CPT | Performed by: INTERNAL MEDICINE

## 2023-03-16 PROCEDURE — G8417 CALC BMI ABV UP PARAM F/U: HCPCS | Performed by: INTERNAL MEDICINE

## 2023-03-16 PROCEDURE — 99214 OFFICE O/P EST MOD 30 MIN: CPT | Performed by: INTERNAL MEDICINE

## 2023-03-16 PROCEDURE — 3079F DIAST BP 80-89 MM HG: CPT | Performed by: INTERNAL MEDICINE

## 2023-03-16 PROCEDURE — G8484 FLU IMMUNIZE NO ADMIN: HCPCS | Performed by: INTERNAL MEDICINE

## 2023-03-16 PROCEDURE — G8427 DOCREV CUR MEDS BY ELIG CLIN: HCPCS | Performed by: INTERNAL MEDICINE

## 2023-03-16 PROCEDURE — 3075F SYST BP GE 130 - 139MM HG: CPT | Performed by: INTERNAL MEDICINE

## 2023-03-16 RX ORDER — METOPROLOL TARTRATE 50 MG/1
50 TABLET, FILM COATED ORAL 2 TIMES DAILY
Qty: 60 TABLET | Refills: 3 | Status: SHIPPED | OUTPATIENT
Start: 2023-03-16

## 2023-03-16 NOTE — PROGRESS NOTES
Chief Complaint   Patient presents with    Follow-up     Groin and neck pressure     Originally the patient referral from ED due to CP and high BP. Had  stress and echo on 2/13/23      Follow up. From cath    3 days back - had cp sharp , heartburn, neck pain B/l and upper thigh pain all together   wake up to it  BP was higher then 160/104  Resolved after hours  7/10 and felt miserable  [place] and working and no issue or pain with his activity  Going to bath room  and urinate almost every 30 min    Heartburn got better and less freq with protonix    Hx of chest pain since beginning of feb 2023    Hx of flu URTI jan 2022    Cath  was done for atypical but recurrent cp and normal coronaries    EKG done 3-3-2023. He was physical work  prior  but no cp with exertion    Denied sob,  dizziness or edema    Patient states he took 100 mg of losartan today as per ED instructions.       Nonsmoker    FHX  None for CAD  Ankle had CMP      Past Surgical History:   Procedure Laterality Date    CYST REMOVAL  2004    neck    WISDOM TOOTH EXTRACTION Bilateral        No Known Allergies     Family History   Problem Relation Age of Onset    High Blood Pressure Father     High Blood Pressure Brother     Heart Disease Paternal Uncle         Social History     Socioeconomic History    Marital status:      Spouse name: Not on file    Number of children: Not on file    Years of education: Not on file    Highest education level: Not on file   Occupational History    Not on file   Tobacco Use    Smoking status: Never    Smokeless tobacco: Never   Substance and Sexual Activity    Alcohol use: Yes     Comment: daily    Drug use: Never    Sexual activity: Yes     Partners: Female   Other Topics Concern    Not on file   Social History Narrative    Not on file     Social Determinants of Health     Financial Resource Strain: Not on file   Food Insecurity: Not on file   Transportation Needs: Not on file   Physical Activity: Not on file Stress: Not on file   Social Connections: Not on file   Intimate Partner Violence: Not on file   Housing Stability: Not on file       Current Outpatient Medications   Medication Sig Dispense Refill    metoprolol tartrate (LOPRESSOR) 50 MG tablet Take 1 tablet by mouth 2 times daily 60 tablet 3    losartan (COZAAR) 50 MG tablet Take 100 mg by mouth daily      pantoprazole (PROTONIX) 40 MG tablet Take 1 tablet by mouth daily 30 tablet 1    aspirin EC 81 MG EC tablet Take 1 tablet by mouth daily 90 tablet 1     No current facility-administered medications for this visit. Review of Systems -     General ROS: negative  Psychological ROS: negative  Hematological and Lymphatic ROS: No history of blood clots or bleeding disorder. Respiratory ROS: no cough,  or wheezing, the rest see HPI  Cardiovascular ROS: See HPI  Gastrointestinal ROS: negative  Genito-Urinary ROS: no dysuria, trouble voiding, or hematuria  Musculoskeletal ROS: negative  Neurological ROS: no TIA or stroke symptoms  Dermatological ROS: negative      Blood pressure 139/89, pulse 73, height 5' 11\" (1.803 m), weight 222 lb 9.6 oz (101 kg).         Physical Examination:    General appearance - alert, well appearing, and in no distress  HEENT- Pink conjunctiva  , Non-icteri sclera,PERRLA  Mental status - alert, oriented to person, place, and time  Neck - supple, no significant adenopathy, no JVD, or carotid bruits  Chest - clear to auscultation, no wheezes, rales or rhonchi, symmetric air entry  Heart - normal rate, regular rhythm, normal S1, S2, no murmurs, rubs, clicks or gallops  Abdomen - soft, nontender, nondistended, no masses or organomegaly  OSMAN- no CVA or flank tenderness, no suprapubic tenderness  Neurological - alert, oriented, normal speech, no focal findings or movement disorder noted  Musculoskeletal/limbs - no joint tenderness, deformity or swelling   - peripheral pulses normal, no pedal edema, no clubbing or cyanosis  Skin - normal coloration and turgor, no rashes, no suspicious skin lesions noted  Psych- appropriate mood and affect    Lab  No results for input(s): CKTOTAL, CKMB, CKMBINDEX, TROPONINI in the last 72 hours.   CBC:   Lab Results   Component Value Date/Time    WBC 6.7 03/03/2023 01:04 PM    RBC 4.80 03/03/2023 01:04 PM    HGB 14.8 03/03/2023 01:04 PM    HCT 43.4 03/03/2023 01:04 PM    MCV 90.4 03/03/2023 01:04 PM    MCH 30.8 03/03/2023 01:04 PM    MCHC 34.1 03/03/2023 01:04 PM    RDW 12.1 02/06/2023 08:53 AM     03/03/2023 01:04 PM    MPV 9.3 03/03/2023 01:04 PM     BMP:    Lab Results   Component Value Date/Time     03/03/2023 01:04 PM    K 4.1 03/03/2023 01:04 PM     03/03/2023 01:04 PM    CO2 26 03/03/2023 01:04 PM    BUN 13 03/03/2023 01:04 PM    LABALBU 4.1 02/06/2023 08:54 AM    CREATININE 0.9 03/03/2023 01:04 PM    CALCIUM 9.9 03/03/2023 01:04 PM    LABGLOM >60 03/03/2023 01:04 PM    GLUCOSE 95 03/03/2023 01:04 PM     Hepatic Function Panel:    Lab Results   Component Value Date/Time    ALKPHOS 62 02/06/2023 08:54 AM    ALT 62 02/06/2023 08:54 AM    AST 20 02/06/2023 08:54 AM    PROT 7.7 02/06/2023 08:54 AM    BILITOT 0.8 02/06/2023 08:54 AM    LABALBU 4.1 02/06/2023 08:54 AM     Magnesium:    Lab Results   Component Value Date/Time    MG 2.2 02/06/2023 08:54 AM     Warfarin PT/INR:  No components found for: PTPATWAR, PTINRWAR  HgBA1c:    Lab Results   Component Value Date/Time    LABA1C 5.1 03/03/2023 01:04 PM     FLP:    Lab Results   Component Value Date/Time    TRIG 267 03/03/2023 01:04 PM    HDL 35 03/03/2023 01:04 PM    LDLCALC 162 03/03/2023 01:04 PM     TSH:    Lab Results   Component Value Date/Time    TSH 1.670 03/03/2023 01:04 PM             Peak HR:150 bpm                      HR response: Appropriate    Peak BP:200/100 mmHg                 BP response: Normal Resting BP with    Predicted HR: 171 bpm                appropriate response to Stress    % of predicted HR: 88                HR/BP product:84416    Test duration:8.5 min                Max exercise: 10.1 METS    Reason for termination:Physician    request                              Exercise effort:Good       Conclusions        Summary    Exercise EKG stress test is not suggestive for ischemia. The nuclear images is not suggestive for myocardial ischemia. Signatures        ----------------------------------------------------------------    Electronically signed by Elvia Gonzales MD (Interpreting    Cardiologist) on 02/13/2023 at 19:34    ----------------------------------------------------------------       Conclusions    Procedure Summary   Angiographically Patent Coronaries. Normal LV systolic function. Normal wall motion. LV size - normal.   EDP 7 mmhg   No Transaortic Gradient      conclusion   Angiographically Patent Coronaries. Normal LV systolic function. Tend to be tachycardic at rest and check TSH and may benefit from B-   Blocker      Recommendations   Continue with aggressive risk factor modification and medical therapy. Estimated Blood Loss:5 ml. Complications:No complications. Signatures      ----------------------------------------------------------------   Electronically signed by Elvia Gonzales MD (Performing   Physician) on 03/03/2023 at 16:17   ----------------------------------------------------------------           Conclusions      Summary   Normal left ventricle size and systolic function. Ejection fraction was   estimated at 60 %. There were no regional left ventricular wall motion   abnormalities and wall thickness was within normal limits.       Signature      ----------------------------------------------------------------   Electronically signed by Elvia Gonzales MD (Interpreting   physician) on 02/13/2023 at 08:31 PM    Ekg 2/6/23  Normal sinus rhythm Normal ECG No previous ECGs available Confirmed by Roxanna Meade MD, KEZIA   Trop neg    Ekg 2/27/23  Sinus  Rhythm   WITHIN NORMAL LIMITS      Ekg 3/16/23  NSR, normal EKG      Assessment  Veras      Diagnosis Orders   1. S/P cardiac cath-3/3/2023- ANGIOGRAPHICALLY PATENT CORONARIES, EDP 7 MMHG,  EF 65%- MED RX  EKG 12 lead      2. Primary hypertension  EKG 12 lead      3. Sinus tachycardia  EKG 12 lead      4. Hx of  Chest pain, atypical- noncardiac  EKG 12 lead        135/90      Plan     The current Meds and lab reviewed    Continue the current treatment and with constant vigilance to changes in symptoms and also any potential side effects. Return for care or seek medical attention immediately if symptoms got worse and/or develop new symptoms. Hx of atypical chest pain - senice beginning of feb and followed URTI in jan 2023  Echo and Ex nuc - WNL  Cath patent coronaries  Cath site look good  Cont asa 81  Noncardiac cause of cp  F/u with pcp    GERD started on protonix  and better      Hypertension, on medical treatment. Seems to be under good control. Patient is compliant with medical treatment.    Cont losartan 100 mg po qd  Home Bp check advised     Sinus tachycardia  Increase lopressor to 50 bid from 25 bid for better bp control    D/w the pat the plan of care    Overall stable and doing well from cardiac stand  Advised to see pcp for work up of the noncardiac cause of cp     Rtc in 3  months    Fidel Schulz [facility]

## 2023-03-30 RX ORDER — LOSARTAN POTASSIUM 50 MG/1
100 TABLET ORAL DAILY
Qty: 90 TABLET | Refills: 1 | Status: SHIPPED | OUTPATIENT
Start: 2023-03-30

## 2023-03-30 NOTE — TELEPHONE ENCOUNTER
German Florez called requesting a refill on the following medications:  Requested Prescriptions     Pending Prescriptions Disp Refills    losartan (COZAAR) 50 MG tablet 30 tablet      Sig: Take 2 tablets by mouth daily     Pharmacy verified:  [email or web address]      Date of last visit:   Date of next visit (if applicable): 1/47/4243

## 2023-05-05 RX ORDER — PANTOPRAZOLE SODIUM 40 MG/1
TABLET, DELAYED RELEASE ORAL
Qty: 90 TABLET | Refills: 1 | Status: SHIPPED | OUTPATIENT
Start: 2023-05-05

## 2023-05-05 RX ORDER — PANTOPRAZOLE SODIUM 40 MG/1
40 TABLET, DELAYED RELEASE ORAL DAILY
Qty: 90 TABLET | Refills: 0 | Status: SHIPPED | OUTPATIENT
Start: 2023-05-05

## 2023-07-19 RX ORDER — METOPROLOL TARTRATE 50 MG/1
TABLET, FILM COATED ORAL
Qty: 60 TABLET | Refills: 0 | OUTPATIENT
Start: 2023-07-19

## 2023-07-21 RX ORDER — METOPROLOL TARTRATE 50 MG/1
50 TABLET, FILM COATED ORAL 2 TIMES DAILY
Qty: 180 TABLET | Refills: 0 | Status: SHIPPED | OUTPATIENT
Start: 2023-07-21

## 2023-08-22 ENCOUNTER — NURSE ONLY (OUTPATIENT)
Dept: LAB | Age: 48
End: 2023-08-22

## 2023-08-22 DIAGNOSIS — I10 PRIMARY HYPERTENSION: ICD-10-CM

## 2023-08-22 LAB
ALBUMIN SERPL BCG-MCNC: 4.5 G/DL (ref 3.5–5.1)
ALP SERPL-CCNC: 54 U/L (ref 38–126)
ALT SERPL W/O P-5'-P-CCNC: 78 U/L (ref 11–66)
ANION GAP SERPL CALC-SCNC: 12 MEQ/L (ref 8–16)
AST SERPL-CCNC: 35 U/L (ref 5–40)
BILIRUB SERPL-MCNC: 0.6 MG/DL (ref 0.3–1.2)
BUN SERPL-MCNC: 15 MG/DL (ref 7–22)
CALCIUM SERPL-MCNC: 9.4 MG/DL (ref 8.5–10.5)
CHLORIDE SERPL-SCNC: 103 MEQ/L (ref 98–111)
CO2 SERPL-SCNC: 27 MEQ/L (ref 23–33)
CREAT SERPL-MCNC: 0.9 MG/DL (ref 0.4–1.2)
GFR SERPL CREATININE-BSD FRML MDRD: > 60 ML/MIN/1.73M2
GLUCOSE FASTING: 98 MG/DL (ref 70–108)
POTASSIUM SERPL-SCNC: 3.9 MEQ/L (ref 3.5–5.2)
PROT SERPL-MCNC: 7.4 G/DL (ref 6.1–8)
SODIUM SERPL-SCNC: 142 MEQ/L (ref 135–145)

## 2023-08-31 ENCOUNTER — OFFICE VISIT (OUTPATIENT)
Dept: FAMILY MEDICINE CLINIC | Age: 48
End: 2023-08-31
Payer: COMMERCIAL

## 2023-08-31 VITALS
BODY MASS INDEX: 32.03 KG/M2 | OXYGEN SATURATION: 98 % | WEIGHT: 228.8 LBS | SYSTOLIC BLOOD PRESSURE: 122 MMHG | HEIGHT: 71 IN | DIASTOLIC BLOOD PRESSURE: 82 MMHG | HEART RATE: 76 BPM

## 2023-08-31 DIAGNOSIS — Z12.11 ENCOUNTER FOR SCREENING COLONOSCOPY: ICD-10-CM

## 2023-08-31 DIAGNOSIS — I10 PRIMARY HYPERTENSION: Primary | ICD-10-CM

## 2023-08-31 DIAGNOSIS — R00.0 SINUS TACHYCARDIA: ICD-10-CM

## 2023-08-31 DIAGNOSIS — Z00.00 WELL ADULT EXAM: ICD-10-CM

## 2023-08-31 PROCEDURE — 1036F TOBACCO NON-USER: CPT | Performed by: FAMILY MEDICINE

## 2023-08-31 PROCEDURE — 3017F COLORECTAL CA SCREEN DOC REV: CPT | Performed by: FAMILY MEDICINE

## 2023-08-31 PROCEDURE — 99214 OFFICE O/P EST MOD 30 MIN: CPT | Performed by: FAMILY MEDICINE

## 2023-08-31 PROCEDURE — 3079F DIAST BP 80-89 MM HG: CPT | Performed by: FAMILY MEDICINE

## 2023-08-31 PROCEDURE — G8427 DOCREV CUR MEDS BY ELIG CLIN: HCPCS | Performed by: FAMILY MEDICINE

## 2023-08-31 PROCEDURE — G8417 CALC BMI ABV UP PARAM F/U: HCPCS | Performed by: FAMILY MEDICINE

## 2023-08-31 PROCEDURE — 3074F SYST BP LT 130 MM HG: CPT | Performed by: FAMILY MEDICINE

## 2023-08-31 RX ORDER — LOSARTAN POTASSIUM AND HYDROCHLOROTHIAZIDE 12.5; 1 MG/1; MG/1
1 TABLET ORAL DAILY
Qty: 90 TABLET | Refills: 3 | Status: SHIPPED | OUTPATIENT
Start: 2023-08-31

## 2023-08-31 RX ORDER — METOPROLOL TARTRATE 50 MG/1
50 TABLET, FILM COATED ORAL 2 TIMES DAILY
Qty: 180 TABLET | Refills: 3 | Status: SHIPPED | OUTPATIENT
Start: 2023-08-31

## 2023-08-31 ASSESSMENT — PATIENT HEALTH QUESTIONNAIRE - PHQ9
SUM OF ALL RESPONSES TO PHQ QUESTIONS 1-9: 0
2. FEELING DOWN, DEPRESSED OR HOPELESS: 0
SUM OF ALL RESPONSES TO PHQ QUESTIONS 1-9: 0
1. LITTLE INTEREST OR PLEASURE IN DOING THINGS: 0
SUM OF ALL RESPONSES TO PHQ QUESTIONS 1-9: 0
SUM OF ALL RESPONSES TO PHQ QUESTIONS 1-9: 0
SUM OF ALL RESPONSES TO PHQ9 QUESTIONS 1 & 2: 0

## 2023-08-31 ASSESSMENT — ENCOUNTER SYMPTOMS
COUGH: 0
TROUBLE SWALLOWING: 0
VOMITING: 0
CONSTIPATION: 0
DIARRHEA: 0
SHORTNESS OF BREATH: 0
SORE THROAT: 0
ABDOMINAL PAIN: 0

## 2023-09-13 ENCOUNTER — COMMUNITY OUTREACH (OUTPATIENT)
Dept: FAMILY MEDICINE CLINIC | Age: 48
End: 2023-09-13

## 2023-10-03 ENCOUNTER — OFFICE VISIT (OUTPATIENT)
Dept: SURGERY | Age: 48
End: 2023-10-03
Payer: COMMERCIAL

## 2023-10-03 VITALS
BODY MASS INDEX: 31.53 KG/M2 | HEIGHT: 71 IN | SYSTOLIC BLOOD PRESSURE: 122 MMHG | WEIGHT: 225.2 LBS | DIASTOLIC BLOOD PRESSURE: 78 MMHG | OXYGEN SATURATION: 95 % | TEMPERATURE: 97.7 F | HEART RATE: 82 BPM

## 2023-10-03 DIAGNOSIS — Z12.11 SCREENING FOR COLORECTAL CANCER: Primary | ICD-10-CM

## 2023-10-03 DIAGNOSIS — Z12.12 SCREENING FOR COLORECTAL CANCER: Primary | ICD-10-CM

## 2023-10-03 PROCEDURE — S0285 CNSLT BEFORE SCREEN COLONOSC: HCPCS | Performed by: SURGERY

## 2023-10-03 RX ORDER — OMEPRAZOLE 20 MG/1
20 CAPSULE, DELAYED RELEASE ORAL DAILY
COMMUNITY

## 2023-10-03 ASSESSMENT — ENCOUNTER SYMPTOMS
COUGH: 0
EYE DISCHARGE: 0
EYE REDNESS: 0
SINUS PAIN: 0
VOMITING: 0
WHEEZING: 0
ABDOMINAL DISTENTION: 0
APNEA: 0
SORE THROAT: 0
ANAL BLEEDING: 0
RECTAL PAIN: 0
SHORTNESS OF BREATH: 0
PHOTOPHOBIA: 0
BACK PAIN: 0
EYE ITCHING: 0
CHOKING: 0
DIARRHEA: 0
FACIAL SWELLING: 0
VOICE CHANGE: 0
EYE PAIN: 0
CONSTIPATION: 0
CHEST TIGHTNESS: 0
SINUS PRESSURE: 0
NAUSEA: 0
ABDOMINAL PAIN: 0
COLOR CHANGE: 0
TROUBLE SWALLOWING: 0
STRIDOR: 0
RHINORRHEA: 0
BLOOD IN STOOL: 0

## 2023-11-29 ENCOUNTER — PREP FOR PROCEDURE (OUTPATIENT)
Dept: SURGERY | Age: 48
End: 2023-11-29

## 2023-11-29 RX ORDER — SODIUM CHLORIDE 450 MG/100ML
INJECTION, SOLUTION INTRAVENOUS CONTINUOUS
Status: CANCELLED | OUTPATIENT
Start: 2023-11-29

## 2023-11-29 RX ORDER — SODIUM CHLORIDE 9 MG/ML
INJECTION, SOLUTION INTRAVENOUS PRN
Status: CANCELLED | OUTPATIENT
Start: 2023-11-29

## 2023-11-29 RX ORDER — SODIUM CHLORIDE 0.9 % (FLUSH) 0.9 %
5-40 SYRINGE (ML) INJECTION PRN
Status: CANCELLED | OUTPATIENT
Start: 2023-11-29

## 2023-11-29 RX ORDER — SODIUM CHLORIDE 0.9 % (FLUSH) 0.9 %
5-40 SYRINGE (ML) INJECTION EVERY 12 HOURS SCHEDULED
Status: CANCELLED | OUTPATIENT
Start: 2023-11-29

## 2023-12-04 ENCOUNTER — HOSPITAL ENCOUNTER (OUTPATIENT)
Age: 48
Setting detail: OUTPATIENT SURGERY
Discharge: HOME | End: 2023-12-04
Attending: SURGERY | Admitting: SURGERY
Payer: COMMERCIAL

## 2023-12-04 VITALS
SYSTOLIC BLOOD PRESSURE: 119 MMHG | HEART RATE: 78 BPM | WEIGHT: 226.2 LBS | OXYGEN SATURATION: 93 % | HEIGHT: 71 IN | BODY MASS INDEX: 31.67 KG/M2 | TEMPERATURE: 97.5 F | RESPIRATION RATE: 18 BRPM | DIASTOLIC BLOOD PRESSURE: 80 MMHG

## 2023-12-04 DIAGNOSIS — Z12.12 SCREENING FOR COLORECTAL CANCER: ICD-10-CM

## 2023-12-04 DIAGNOSIS — Z12.11 SCREENING FOR COLORECTAL CANCER: ICD-10-CM

## 2023-12-04 PROCEDURE — 7100000010 HC PHASE II RECOVERY - FIRST 15 MIN: Performed by: SURGERY

## 2023-12-04 PROCEDURE — 2709999900 HC NON-CHARGEABLE SUPPLY: Performed by: SURGERY

## 2023-12-04 PROCEDURE — 6360000002 HC RX W HCPCS: Performed by: SURGERY

## 2023-12-04 PROCEDURE — 3609027000 HC COLONOSCOPY: Performed by: SURGERY

## 2023-12-04 PROCEDURE — 7100000011 HC PHASE II RECOVERY - ADDTL 15 MIN: Performed by: SURGERY

## 2023-12-04 PROCEDURE — 2580000003 HC RX 258: Performed by: SURGERY

## 2023-12-04 PROCEDURE — 45378 DIAGNOSTIC COLONOSCOPY: CPT | Performed by: SURGERY

## 2023-12-04 RX ORDER — FENTANYL CITRATE 50 UG/ML
INJECTION, SOLUTION INTRAMUSCULAR; INTRAVENOUS PRN
Status: DISCONTINUED | OUTPATIENT
Start: 2023-12-04 | End: 2023-12-04 | Stop reason: ALTCHOICE

## 2023-12-04 RX ORDER — MIDAZOLAM HYDROCHLORIDE 1 MG/ML
INJECTION INTRAMUSCULAR; INTRAVENOUS PRN
Status: DISCONTINUED | OUTPATIENT
Start: 2023-12-04 | End: 2023-12-04 | Stop reason: ALTCHOICE

## 2023-12-04 RX ORDER — SODIUM CHLORIDE 450 MG/100ML
INJECTION, SOLUTION INTRAVENOUS CONTINUOUS
Status: DISCONTINUED | OUTPATIENT
Start: 2023-12-04 | End: 2023-12-04 | Stop reason: HOSPADM

## 2023-12-04 RX ORDER — SODIUM CHLORIDE 9 MG/ML
INJECTION, SOLUTION INTRAVENOUS PRN
Status: DISCONTINUED | OUTPATIENT
Start: 2023-12-04 | End: 2023-12-04 | Stop reason: HOSPADM

## 2023-12-04 RX ORDER — SODIUM CHLORIDE 0.9 % (FLUSH) 0.9 %
5-40 SYRINGE (ML) INJECTION EVERY 12 HOURS SCHEDULED
Status: DISCONTINUED | OUTPATIENT
Start: 2023-12-04 | End: 2023-12-04 | Stop reason: HOSPADM

## 2023-12-04 RX ORDER — SODIUM CHLORIDE 0.9 % (FLUSH) 0.9 %
5-40 SYRINGE (ML) INJECTION PRN
Status: DISCONTINUED | OUTPATIENT
Start: 2023-12-04 | End: 2023-12-04 | Stop reason: HOSPADM

## 2023-12-04 RX ADMIN — SODIUM CHLORIDE: 9 INJECTION, SOLUTION INTRAVENOUS at 07:04

## 2023-12-04 ASSESSMENT — PAIN - FUNCTIONAL ASSESSMENT: PAIN_FUNCTIONAL_ASSESSMENT: NONE - DENIES PAIN

## 2023-12-04 NOTE — PROGRESS NOTES
Recovery mode. Patient denies discomfort. Passing gas, taking fluids. Dr. Ellis Mccullough discussed findings with patient and wife. Discharge instructions provided and understanding verbalized.

## 2023-12-04 NOTE — OP NOTE
the cecum, ascending colon, transverse colon, descending colon, sigmoid colon and rectum which were without pathology. The colonoscope was then removed after the colon was decompressed. The patient tolerated procedure well without immediate complication evident. Postoperative findings were discussed with the patient's family. He was given discharge instructions and will follow up colonoscopy in 10 years for reevaluation.       Electronically signed by Kori Alexandre DO on 12/4/2023 at 7:59 AM

## 2023-12-04 NOTE — DISCHARGE INSTRUCTIONS
ENDOSCOPY DISCHARGE INSTRUCTION SHEET  EGD/COLONOSCOPY  SARAVANAN Wheeler  12/4/2023    Call your doctor at [phone] if any of the following symptoms occur at anytime   *Excessive pain - a few cramps are to be expected   *Temperature above 100 degrees    *Excessive bleeding or large clots of blood. Don't worry about a few specks of blood. *Repeated nausea and vomiting. 2.    Follow colonoscopy recommended in 10 years. [address] [id number]  [place], [address]  Electronically signed by Brii García DO on 12/4/2023 at 7:59 AM

## 2024-02-29 ENCOUNTER — OFFICE VISIT (OUTPATIENT)
Dept: FAMILY MEDICINE CLINIC | Age: 49
End: 2024-02-29
Payer: COMMERCIAL

## 2024-02-29 VITALS
HEIGHT: 71 IN | HEART RATE: 78 BPM | SYSTOLIC BLOOD PRESSURE: 120 MMHG | BODY MASS INDEX: 32.23 KG/M2 | WEIGHT: 230.2 LBS | DIASTOLIC BLOOD PRESSURE: 78 MMHG | OXYGEN SATURATION: 95 %

## 2024-02-29 DIAGNOSIS — K21.9 GASTROESOPHAGEAL REFLUX DISEASE WITHOUT ESOPHAGITIS: ICD-10-CM

## 2024-02-29 DIAGNOSIS — R00.0 SINUS TACHYCARDIA: ICD-10-CM

## 2024-02-29 DIAGNOSIS — I10 PRIMARY HYPERTENSION: ICD-10-CM

## 2024-02-29 DIAGNOSIS — Z00.00 WELL ADULT EXAM: Primary | ICD-10-CM

## 2024-02-29 PROBLEM — R07.89 CHEST PRESSURE: Status: RESOLVED | Noted: 2023-02-27 | Resolved: 2024-02-29

## 2024-02-29 PROBLEM — R07.89 CHEST PAIN, ATYPICAL: Status: RESOLVED | Noted: 2023-02-07 | Resolved: 2024-02-29

## 2024-02-29 PROCEDURE — 99396 PREV VISIT EST AGE 40-64: CPT | Performed by: FAMILY MEDICINE

## 2024-02-29 PROCEDURE — 3074F SYST BP LT 130 MM HG: CPT | Performed by: FAMILY MEDICINE

## 2024-02-29 PROCEDURE — G8484 FLU IMMUNIZE NO ADMIN: HCPCS | Performed by: FAMILY MEDICINE

## 2024-02-29 PROCEDURE — 3078F DIAST BP <80 MM HG: CPT | Performed by: FAMILY MEDICINE

## 2024-02-29 RX ORDER — NICOTINE POLACRILEX 4 MG/1
20 GUM, CHEWING ORAL DAILY
COMMUNITY

## 2024-02-29 RX ORDER — LOSARTAN POTASSIUM AND HYDROCHLOROTHIAZIDE 12.5; 1 MG/1; MG/1
1 TABLET ORAL DAILY
Qty: 90 TABLET | Refills: 3 | Status: SHIPPED | OUTPATIENT
Start: 2024-02-29

## 2024-02-29 RX ORDER — LOSARTAN POTASSIUM AND HYDROCHLOROTHIAZIDE 12.5; 1 MG/1; MG/1
1 TABLET ORAL DAILY
COMMUNITY
End: 2024-02-29 | Stop reason: SDUPTHER

## 2024-02-29 RX ORDER — METOPROLOL SUCCINATE 50 MG/1
50 TABLET, EXTENDED RELEASE ORAL DAILY
COMMUNITY
End: 2024-02-29 | Stop reason: CLARIF

## 2024-02-29 RX ORDER — METOPROLOL TARTRATE 50 MG/1
25 TABLET, FILM COATED ORAL 2 TIMES DAILY
COMMUNITY
End: 2024-02-29 | Stop reason: SDUPTHER

## 2024-02-29 ASSESSMENT — PATIENT HEALTH QUESTIONNAIRE - PHQ9
SUM OF ALL RESPONSES TO PHQ QUESTIONS 1-9: 0
2. FEELING DOWN, DEPRESSED OR HOPELESS: 0
1. LITTLE INTEREST OR PLEASURE IN DOING THINGS: 0
SUM OF ALL RESPONSES TO PHQ QUESTIONS 1-9: 0
SUM OF ALL RESPONSES TO PHQ9 QUESTIONS 1 & 2: 0

## 2024-02-29 NOTE — PROGRESS NOTES
[facility]  [address]  [place] [address]  Dept: [phone]     SUBJECTIVE     Prakash Arechiga is a 48 y.o.male    Pt presents for follow up of medications.     Pt feeling ok since last visit- interval history and any new issues noted below:       Patient Active Problem List   Diagnosis    Primary hypertension    S/P cardiac cath-3/3/2023- ANGIOGRAPHICALLY PATENT CORONARIES, EDP 7 MMHG,  EF 65%- MED RX    Sinus tachycardia    Gastroesophageal reflux disease without esophagitis       Current Outpatient Medications   Medication Sig Dispense Refill    omeprazole 20 MG EC tablet Take 1 tablet by mouth daily      metoprolol tartrate (LOPRESSOR) 25 MG tablet Take 1 tablet by mouth 2 times daily 180 tablet 3    losartan-hydroCHLOROthiazide (HYZAAR) 100-12.5 MG per tablet Take 1 tablet by mouth daily 90 tablet 3     No current facility-administered medications for this visit.       Review of Systems   HENT:  Positive for ear pain and hearing loss.        OBJECTIVE     /78 (Site: Left Upper Arm, Position: Sitting, Cuff Size: Large Adult)   Pulse 78   Ht 1.803 m (5' 11\")   Wt 104.4 kg (230 lb 3.2 oz)   SpO2 95%   BMI 32.11 kg/m²   Body mass index is 32.11 kg/m².  BP Readings from Last 3 Encounters:   02/29/24 120/78   12/04/23 119/80   10/03/23 122/78         Physical Exam  Vitals and nursing note reviewed.   Constitutional:       General: He is not in acute distress.     Appearance: Normal appearance. He is normal weight. He is not ill-appearing.   HENT:      Head: Normocephalic and atraumatic.      Right Ear: Tympanic membrane, ear canal and external ear normal.      Left Ear: Tympanic membrane, ear canal and external ear normal.      Nose: Nose normal.      Mouth/Throat:      Mouth: Mucous membranes are moist.      Pharynx: Oropharynx is clear.   Eyes:      Extraocular Movements: Extraocular movements intact.      Conjunctiva/sclera: Conjunctivae normal.

## 2024-04-10 ENCOUNTER — NURSE ONLY (OUTPATIENT)
Dept: LAB | Age: 49
End: 2024-04-10

## 2024-04-10 DIAGNOSIS — Z00.00 WELL ADULT EXAM: ICD-10-CM

## 2024-04-10 LAB
ALBUMIN SERPL BCG-MCNC: 4.5 G/DL (ref 3.5–5.1)
ALP SERPL-CCNC: 55 U/L (ref 38–126)
ALT SERPL W/O P-5'-P-CCNC: 71 U/L (ref 11–66)
ANION GAP SERPL CALC-SCNC: 14 MEQ/L (ref 8–16)
AST SERPL-CCNC: 29 U/L (ref 5–40)
BASOPHILS ABSOLUTE: 0 THOU/MM3 (ref 0–0.1)
BASOPHILS NFR BLD AUTO: 0.7 %
BILIRUB SERPL-MCNC: 0.6 MG/DL (ref 0.3–1.2)
BUN SERPL-MCNC: 15 MG/DL (ref 7–22)
CALCIUM SERPL-MCNC: 9.5 MG/DL (ref 8.5–10.5)
CHLORIDE SERPL-SCNC: 98 MEQ/L (ref 98–111)
CHOLEST SERPL-MCNC: 251 MG/DL (ref 100–199)
CO2 SERPL-SCNC: 25 MEQ/L (ref 23–33)
CREAT SERPL-MCNC: 0.9 MG/DL (ref 0.4–1.2)
DEPRECATED RDW RBC AUTO: 39.4 FL (ref 35–45)
EOSINOPHIL NFR BLD AUTO: 3.6 %
EOSINOPHILS ABSOLUTE: 0.2 THOU/MM3 (ref 0–0.4)
ERYTHROCYTE [DISTWIDTH] IN BLOOD BY AUTOMATED COUNT: 12.4 % (ref 11.5–14.5)
GFR SERPL CREATININE-BSD FRML MDRD: > 90 ML/MIN/1.73M2
GLUCOSE SERPL-MCNC: 97 MG/DL (ref 70–108)
HCT VFR BLD AUTO: 44.5 % (ref 42–52)
HDLC SERPL-MCNC: 32 MG/DL
HGB BLD-MCNC: 16.1 GM/DL (ref 14–18)
IMM GRANULOCYTES # BLD AUTO: 0.02 THOU/MM3 (ref 0–0.07)
IMM GRANULOCYTES NFR BLD AUTO: 0.3 %
LDLC SERPL CALC-MCNC: ABNORMAL MG/DL
LYMPHOCYTES ABSOLUTE: 2.5 THOU/MM3 (ref 1–4.8)
LYMPHOCYTES NFR BLD AUTO: 43.3 %
MCH RBC QN AUTO: 31.6 PG (ref 26–33)
MCHC RBC AUTO-ENTMCNC: 36.2 GM/DL (ref 32.2–35.5)
MCV RBC AUTO: 87.4 FL (ref 80–94)
MONOCYTES ABSOLUTE: 0.5 THOU/MM3 (ref 0.4–1.3)
MONOCYTES NFR BLD AUTO: 8 %
NEUTROPHILS NFR BLD AUTO: 44.1 %
NRBC BLD AUTO-RTO: 0 /100 WBC
PLATELET # BLD AUTO: 250 THOU/MM3 (ref 130–400)
PMV BLD AUTO: 10 FL (ref 9.4–12.4)
POTASSIUM SERPL-SCNC: 3.9 MEQ/L (ref 3.5–5.2)
PROT SERPL-MCNC: 7.3 G/DL (ref 6.1–8)
PSA SERPL-MCNC: 0.45 NG/ML (ref 0–1)
RBC # BLD AUTO: 5.09 MILL/MM3 (ref 4.7–6.1)
SEGMENTED NEUTROPHILS ABSOLUTE COUNT: 2.6 THOU/MM3 (ref 1.8–7.7)
SODIUM SERPL-SCNC: 137 MEQ/L (ref 135–145)
TRIGL SERPL-MCNC: 490 MG/DL (ref 0–199)
TSH SERPL DL<=0.005 MIU/L-ACNC: 1.59 UIU/ML (ref 0.4–4.2)
WBC # BLD AUTO: 5.8 THOU/MM3 (ref 4.8–10.8)

## 2024-04-11 ENCOUNTER — TELEPHONE (OUTPATIENT)
Dept: FAMILY MEDICINE CLINIC | Age: 49
End: 2024-04-11

## 2024-04-11 DIAGNOSIS — R79.89 ABNORMAL LFTS: Primary | ICD-10-CM

## 2024-04-11 NOTE — TELEPHONE ENCOUNTER
Called lm on vm for the pt to call the office to let him know of his US of Liver apt which is 4-22-24 at 10 am arrive at 9:45 am and NPO 8 hrs prior to test at [facility].     Visit Type: LIMA US LIVER     LIMA  LIVER  PREPS:  * Arrive 15 minutes prior  * Nothing to eat for drink 8 hours prior to appointment. prior to test  * If gallbladder removed, no prep  * Bring a list of current medications   Dept directions for [facility]:  [facility]  [address]  [place]  [address]    Turn Right once inside [address].

## 2024-04-22 ENCOUNTER — HOSPITAL ENCOUNTER (OUTPATIENT)
Dept: ULTRASOUND IMAGING | Age: 49
Discharge: HOME | End: 2024-04-22
Payer: COMMERCIAL

## 2024-04-22 DIAGNOSIS — R79.89 ABNORMAL LFTS: ICD-10-CM

## 2024-04-22 PROCEDURE — 76705 ECHO EXAM OF ABDOMEN: CPT

## 2024-04-24 DIAGNOSIS — K76.0 METABOLIC DYSFUNCTION-ASSOCIATED STEATOTIC LIVER DISEASE (MASLD): Primary | ICD-10-CM

## 2024-12-03 ENCOUNTER — TELEPHONE (OUTPATIENT)
Dept: FAMILY MEDICINE CLINIC | Age: 49
End: 2024-12-03

## 2024-12-03 NOTE — TELEPHONE ENCOUNTER
----- Message from Madiha TRUJILLO sent at 12/3/2024  9:17 AM EST -----  Regarding: E[facility] Appointment Request  ECC Appointment Request    Patient needs appointment for ECC Appointment Type: Annual Visit.    Patient Requested Dates(s): Earlier than 3/6/2025  Patient Requested Time: Anytime  Provider Name: Christiano Jimenez MD      Reason for Appointment Request: Patient received an email stating that they need to reschedule his appointment since the provider will not be available.   --------------------------------------------------------------------------------------------------------------------------    Relationship to Patient: Spouse/Partner      Call Back Information: OK to leave message on voicemail  Preferred Call Back Number: Phone

## 2024-12-12 RX ORDER — METOPROLOL TARTRATE 50 MG
TABLET ORAL
Qty: 180 TABLET | Refills: 3 | Status: SHIPPED | OUTPATIENT
Start: 2024-12-12

## 2025-03-19 ASSESSMENT — PATIENT HEALTH QUESTIONNAIRE - PHQ9
1. LITTLE INTEREST OR PLEASURE IN DOING THINGS: NOT AT ALL
SUM OF ALL RESPONSES TO PHQ QUESTIONS 1-9: 0
1. LITTLE INTEREST OR PLEASURE IN DOING THINGS: NOT AT ALL
2. FEELING DOWN, DEPRESSED OR HOPELESS: NOT AT ALL
SUM OF ALL RESPONSES TO PHQ QUESTIONS 1-9: 0
SUM OF ALL RESPONSES TO PHQ9 QUESTIONS 1 & 2: 0
SUM OF ALL RESPONSES TO PHQ QUESTIONS 1-9: 0
2. FEELING DOWN, DEPRESSED OR HOPELESS: NOT AT ALL
SUM OF ALL RESPONSES TO PHQ QUESTIONS 1-9: 0

## 2025-03-20 ENCOUNTER — OFFICE VISIT (OUTPATIENT)
Dept: FAMILY MEDICINE CLINIC | Age: 50
End: 2025-03-20
Payer: COMMERCIAL

## 2025-03-20 VITALS
BODY MASS INDEX: 31.78 KG/M2 | WEIGHT: 227 LBS | SYSTOLIC BLOOD PRESSURE: 132 MMHG | OXYGEN SATURATION: 97 % | HEIGHT: 71 IN | HEART RATE: 76 BPM | DIASTOLIC BLOOD PRESSURE: 76 MMHG

## 2025-03-20 DIAGNOSIS — Z00.00 ENCOUNTER FOR WELL ADULT EXAM WITHOUT ABNORMAL FINDINGS: Primary | ICD-10-CM

## 2025-03-20 DIAGNOSIS — I10 PRIMARY HYPERTENSION: ICD-10-CM

## 2025-03-20 DIAGNOSIS — R00.0 SINUS TACHYCARDIA: ICD-10-CM

## 2025-03-20 PROCEDURE — 3075F SYST BP GE 130 - 139MM HG: CPT | Performed by: FAMILY MEDICINE

## 2025-03-20 PROCEDURE — 99396 PREV VISIT EST AGE 40-64: CPT | Performed by: FAMILY MEDICINE

## 2025-03-20 PROCEDURE — 3078F DIAST BP <80 MM HG: CPT | Performed by: FAMILY MEDICINE

## 2025-03-20 RX ORDER — METOPROLOL TARTRATE 25 MG/1
25 TABLET, FILM COATED ORAL 2 TIMES DAILY
Qty: 180 TABLET | Refills: 3 | Status: SHIPPED | OUTPATIENT
Start: 2025-03-20

## 2025-03-20 RX ORDER — LOSARTAN POTASSIUM AND HYDROCHLOROTHIAZIDE 12.5; 1 MG/1; MG/1
1 TABLET ORAL DAILY
Qty: 90 TABLET | Refills: 3 | Status: SHIPPED | OUTPATIENT
Start: 2025-03-20

## 2025-03-20 SDOH — ECONOMIC STABILITY: FOOD INSECURITY: WITHIN THE PAST 12 MONTHS, THE FOOD YOU BOUGHT JUST DIDN'T LAST AND YOU DIDN'T HAVE MONEY TO GET MORE.: NEVER TRUE

## 2025-03-20 SDOH — ECONOMIC STABILITY: FOOD INSECURITY: WITHIN THE PAST 12 MONTHS, YOU WORRIED THAT YOUR FOOD WOULD RUN OUT BEFORE YOU GOT MONEY TO BUY MORE.: NEVER TRUE

## 2025-03-20 NOTE — PROGRESS NOTES
patient instructions/AVS.    The patient (or guardian, if applicable) and other individuals in attendance with the patient were advised that Artificial Intelligence will be utilized during this visit to record and process the conversation to generate a clinical note. The patient (or guardian, if applicable) and other individuals in attendance at the appointment consented to the use of AI, including the recording.

## 2025-04-16 ENCOUNTER — RESULTS FOLLOW-UP (OUTPATIENT)
Dept: FAMILY MEDICINE CLINIC | Age: 50
End: 2025-04-16

## 2025-04-16 ENCOUNTER — LAB (OUTPATIENT)
Dept: LAB | Age: 50
End: 2025-04-16

## 2025-04-16 DIAGNOSIS — Z00.00 ENCOUNTER FOR WELL ADULT EXAM WITHOUT ABNORMAL FINDINGS: ICD-10-CM

## 2025-04-16 LAB
ALBUMIN SERPL BCG-MCNC: 4.4 G/DL (ref 3.4–4.9)
ALP SERPL-CCNC: 53 U/L (ref 40–129)
ALT SERPL W/O P-5'-P-CCNC: 52 U/L (ref 10–50)
ANION GAP SERPL CALC-SCNC: 11 MEQ/L (ref 8–16)
AST SERPL-CCNC: 30 U/L (ref 10–50)
BASOPHILS ABSOLUTE: 0 THOU/MM3 (ref 0–0.1)
BASOPHILS NFR BLD AUTO: 0.8 %
BILIRUB SERPL-MCNC: 0.6 MG/DL (ref 0.3–1.2)
BUN SERPL-MCNC: 18 MG/DL (ref 8–23)
CALCIUM SERPL-MCNC: 9.9 MG/DL (ref 8.6–10)
CHLORIDE SERPL-SCNC: 103 MEQ/L (ref 98–111)
CHOLEST SERPL-MCNC: 252 MG/DL (ref 100–199)
CO2 SERPL-SCNC: 28 MEQ/L (ref 22–29)
CREAT SERPL-MCNC: 0.9 MG/DL (ref 0.7–1.2)
DEPRECATED RDW RBC AUTO: 38.7 FL (ref 35–45)
EOSINOPHIL NFR BLD AUTO: 3.8 %
EOSINOPHILS ABSOLUTE: 0.2 THOU/MM3 (ref 0–0.4)
ERYTHROCYTE [DISTWIDTH] IN BLOOD BY AUTOMATED COUNT: 12.1 % (ref 11.5–14.5)
GFR SERPL CREATININE-BSD FRML MDRD: > 90 ML/MIN/1.73M2
GLUCOSE SERPL-MCNC: 100 MG/DL (ref 74–109)
HCT VFR BLD AUTO: 43.9 % (ref 42–52)
HDLC SERPL-MCNC: 29 MG/DL
HGB BLD-MCNC: 15.4 GM/DL (ref 14–18)
IMM GRANULOCYTES # BLD AUTO: 0.02 THOU/MM3 (ref 0–0.07)
IMM GRANULOCYTES NFR BLD AUTO: 0.3 %
LDLC SERPL CALC-MCNC: 149 MG/DL
LYMPHOCYTES ABSOLUTE: 2.5 THOU/MM3 (ref 1–4.8)
LYMPHOCYTES NFR BLD AUTO: 41.4 %
MCH RBC QN AUTO: 30.9 PG (ref 26–33)
MCHC RBC AUTO-ENTMCNC: 35.1 GM/DL (ref 32.2–35.5)
MCV RBC AUTO: 88 FL (ref 80–94)
MONOCYTES ABSOLUTE: 0.4 THOU/MM3 (ref 0.4–1.3)
MONOCYTES NFR BLD AUTO: 7 %
NEUTROPHILS ABSOLUTE: 2.8 THOU/MM3 (ref 1.8–7.7)
NEUTROPHILS NFR BLD AUTO: 46.7 %
NRBC BLD AUTO-RTO: 0 /100 WBC
PLATELET # BLD AUTO: 272 THOU/MM3 (ref 130–400)
PMV BLD AUTO: 9.7 FL (ref 9.4–12.4)
POTASSIUM SERPL-SCNC: 3.8 MEQ/L (ref 3.5–5.2)
PROT SERPL-MCNC: 7.3 G/DL (ref 6.4–8.3)
PSA SERPL-MCNC: 0.54 NG/ML (ref 0–1)
RBC # BLD AUTO: 4.99 MILL/MM3 (ref 4.7–6.1)
SODIUM SERPL-SCNC: 142 MEQ/L (ref 135–145)
TRIGL SERPL-MCNC: 368 MG/DL (ref 0–199)
TSH SERPL DL<=0.05 MIU/L-ACNC: 1.53 UIU/ML (ref 0.27–4.2)
WBC # BLD AUTO: 6.1 THOU/MM3 (ref 4.8–10.8)

## 2025-06-05 ENCOUNTER — OFFICE VISIT (OUTPATIENT)
Dept: FAMILY MEDICINE CLINIC | Age: 50
End: 2025-06-05
Payer: COMMERCIAL

## 2025-06-05 VITALS
OXYGEN SATURATION: 95 % | SYSTOLIC BLOOD PRESSURE: 138 MMHG | DIASTOLIC BLOOD PRESSURE: 76 MMHG | HEART RATE: 76 BPM | HEIGHT: 71 IN | BODY MASS INDEX: 30.52 KG/M2 | WEIGHT: 218 LBS

## 2025-06-05 DIAGNOSIS — L98.9 SKIN LESION OF CHEST WALL: Primary | ICD-10-CM

## 2025-06-05 DIAGNOSIS — L98.9 SKIN LESION: ICD-10-CM

## 2025-06-05 PROCEDURE — 1036F TOBACCO NON-USER: CPT | Performed by: FAMILY MEDICINE

## 2025-06-05 PROCEDURE — 99213 OFFICE O/P EST LOW 20 MIN: CPT | Performed by: FAMILY MEDICINE

## 2025-06-05 PROCEDURE — G8417 CALC BMI ABV UP PARAM F/U: HCPCS | Performed by: FAMILY MEDICINE

## 2025-06-05 PROCEDURE — G8427 DOCREV CUR MEDS BY ELIG CLIN: HCPCS | Performed by: FAMILY MEDICINE

## 2025-06-05 PROCEDURE — 3078F DIAST BP <80 MM HG: CPT | Performed by: FAMILY MEDICINE

## 2025-06-05 PROCEDURE — 3075F SYST BP GE 130 - 139MM HG: CPT | Performed by: FAMILY MEDICINE

## 2025-06-05 PROCEDURE — 3017F COLORECTAL CA SCREEN DOC REV: CPT | Performed by: FAMILY MEDICINE

## 2025-06-05 RX ORDER — BETAMETHASONE DIPROPIONATE 0.05 %
OINTMENT (GRAM) TOPICAL
Qty: 15 G | Refills: 0 | Status: SHIPPED | OUTPATIENT
Start: 2025-06-05

## 2025-06-30 ENCOUNTER — PATIENT MESSAGE (OUTPATIENT)
Dept: FAMILY MEDICINE CLINIC | Age: 50
End: 2025-06-30

## (undated) DEVICE — GLOVE ORTHO 8   MSG9480